# Patient Record
Sex: MALE | Race: WHITE
[De-identification: names, ages, dates, MRNs, and addresses within clinical notes are randomized per-mention and may not be internally consistent; named-entity substitution may affect disease eponyms.]

---

## 2017-04-17 ENCOUNTER — RECORD ABSTRACTING (OUTPATIENT)
Age: 82
End: 2017-04-17

## 2017-04-17 DIAGNOSIS — Z86.73 PERSONAL HISTORY OF TRANSIENT ISCHEMIC ATTACK (TIA), AND CEREBRAL INFARCTION W/OUT RESIDUAL DEFICITS: ICD-10-CM

## 2017-04-17 PROBLEM — Z00.00 ENCOUNTER FOR PREVENTIVE HEALTH EXAMINATION: Status: ACTIVE | Noted: 2017-04-17

## 2017-04-17 RX ORDER — RAMIPRIL 5 MG/1
5 CAPSULE ORAL
Refills: 0 | Status: ACTIVE | COMMUNITY

## 2017-04-17 RX ORDER — ASPIRIN 325 MG/1
TABLET, FILM COATED ORAL
Refills: 0 | Status: ACTIVE | COMMUNITY

## 2017-04-17 RX ORDER — ATORVASTATIN CALCIUM 40 MG/1
40 TABLET, FILM COATED ORAL
Refills: 0 | Status: ACTIVE | COMMUNITY

## 2017-05-24 ENCOUNTER — APPOINTMENT (OUTPATIENT)
Dept: UROLOGY | Facility: CLINIC | Age: 82
End: 2017-05-24
Payer: MEDICARE

## 2017-05-24 ENCOUNTER — OUTPATIENT (OUTPATIENT)
Dept: OUTPATIENT SERVICES | Facility: HOSPITAL | Age: 82
LOS: 1 days | Discharge: HOME | End: 2017-05-24

## 2017-05-24 VITALS — SYSTOLIC BLOOD PRESSURE: 135 MMHG | HEART RATE: 75 BPM | DIASTOLIC BLOOD PRESSURE: 75 MMHG

## 2017-05-24 DIAGNOSIS — Z86.79 PERSONAL HISTORY OF OTHER DISEASES OF THE CIRCULATORY SYSTEM: ICD-10-CM

## 2017-05-24 DIAGNOSIS — Z86.39 PERSONAL HISTORY OF OTHER ENDOCRINE, NUTRITIONAL AND METABOLIC DISEASE: ICD-10-CM

## 2017-05-24 LAB
BILIRUB UR QL STRIP: NORMAL
CLARITY UR: NORMAL
COLLECTION METHOD: NORMAL
GLUCOSE UR-MCNC: NORMAL
HCG UR QL: NORMAL EU/DL
HGB UR QL STRIP.AUTO: NORMAL
KETONES UR-MCNC: NORMAL
LEUKOCYTE ESTERASE UR QL STRIP: 500
NITRITE UR QL STRIP: ABNORMAL
PH UR STRIP: 5
PROT UR STRIP-MCNC: NORMAL
SP GR UR STRIP: 1.02

## 2017-05-24 PROCEDURE — 81003 URINALYSIS AUTO W/O SCOPE: CPT | Mod: QW

## 2017-05-24 PROCEDURE — 51798 US URINE CAPACITY MEASURE: CPT

## 2017-05-24 PROCEDURE — 99214 OFFICE O/P EST MOD 30 MIN: CPT

## 2017-06-28 DIAGNOSIS — N39.0 URINARY TRACT INFECTION, SITE NOT SPECIFIED: ICD-10-CM

## 2017-10-26 ENCOUNTER — APPOINTMENT (OUTPATIENT)
Dept: UROLOGY | Facility: CLINIC | Age: 82
End: 2017-10-26

## 2018-06-16 ENCOUNTER — OUTPATIENT (OUTPATIENT)
Dept: OUTPATIENT SERVICES | Facility: HOSPITAL | Age: 83
LOS: 1 days | Discharge: HOME | End: 2018-06-16

## 2018-06-16 DIAGNOSIS — I10 ESSENTIAL (PRIMARY) HYPERTENSION: ICD-10-CM

## 2018-06-16 DIAGNOSIS — E55.9 VITAMIN D DEFICIENCY, UNSPECIFIED: ICD-10-CM

## 2018-06-16 DIAGNOSIS — E11.9 TYPE 2 DIABETES MELLITUS WITHOUT COMPLICATIONS: ICD-10-CM

## 2021-02-18 ENCOUNTER — INPATIENT (INPATIENT)
Facility: HOSPITAL | Age: 86
LOS: 1 days | Discharge: HOME | End: 2021-02-20
Attending: STUDENT IN AN ORGANIZED HEALTH CARE EDUCATION/TRAINING PROGRAM | Admitting: STUDENT IN AN ORGANIZED HEALTH CARE EDUCATION/TRAINING PROGRAM
Payer: MEDICARE

## 2021-02-18 VITALS
DIASTOLIC BLOOD PRESSURE: 60 MMHG | HEART RATE: 84 BPM | RESPIRATION RATE: 18 BRPM | TEMPERATURE: 98 F | OXYGEN SATURATION: 99 % | SYSTOLIC BLOOD PRESSURE: 98 MMHG | WEIGHT: 169.98 LBS

## 2021-02-18 DIAGNOSIS — Z98.890 OTHER SPECIFIED POSTPROCEDURAL STATES: Chronic | ICD-10-CM

## 2021-02-18 LAB
ALBUMIN SERPL ELPH-MCNC: 3.6 G/DL — SIGNIFICANT CHANGE UP (ref 3.5–5.2)
ALP SERPL-CCNC: 52 U/L — SIGNIFICANT CHANGE UP (ref 30–115)
ALT FLD-CCNC: 36 U/L — SIGNIFICANT CHANGE UP (ref 0–41)
ANION GAP SERPL CALC-SCNC: 17 MMOL/L — HIGH (ref 7–14)
ANISOCYTOSIS BLD QL: SLIGHT — SIGNIFICANT CHANGE UP
APPEARANCE UR: ABNORMAL
APTT BLD: 29.1 SEC — SIGNIFICANT CHANGE UP (ref 27–39.2)
AST SERPL-CCNC: 51 U/L — HIGH (ref 0–41)
B-OH-BUTYR SERPL-SCNC: <0.2 MMOL/L — SIGNIFICANT CHANGE UP
BACTERIA # UR AUTO: NEGATIVE — SIGNIFICANT CHANGE UP
BASE EXCESS BLDV CALC-SCNC: 2.4 MMOL/L — HIGH (ref -2–2)
BASOPHILS # BLD AUTO: 0 K/UL — SIGNIFICANT CHANGE UP (ref 0–0.2)
BASOPHILS NFR BLD AUTO: 0 % — SIGNIFICANT CHANGE UP (ref 0–1)
BILIRUB SERPL-MCNC: 0.9 MG/DL — SIGNIFICANT CHANGE UP (ref 0.2–1.2)
BILIRUB UR-MCNC: NEGATIVE — SIGNIFICANT CHANGE UP
BUN SERPL-MCNC: 40 MG/DL — HIGH (ref 10–20)
CA-I SERPL-SCNC: 1.12 MMOL/L — SIGNIFICANT CHANGE UP (ref 1.12–1.3)
CALCIUM SERPL-MCNC: 8.8 MG/DL — SIGNIFICANT CHANGE UP (ref 8.5–10.1)
CHLORIDE SERPL-SCNC: 85 MMOL/L — LOW (ref 98–110)
CO2 SERPL-SCNC: 23 MMOL/L — SIGNIFICANT CHANGE UP (ref 17–32)
COLOR SPEC: ABNORMAL
CREAT SERPL-MCNC: 2.2 MG/DL — HIGH (ref 0.7–1.5)
DIFF PNL FLD: ABNORMAL
EOSINOPHIL # BLD AUTO: 0.08 K/UL — SIGNIFICANT CHANGE UP (ref 0–0.7)
EOSINOPHIL NFR BLD AUTO: 0.9 % — SIGNIFICANT CHANGE UP (ref 0–8)
EPI CELLS # UR: 1 /HPF — SIGNIFICANT CHANGE UP (ref 0–5)
GAS PNL BLDV: 125 MMOL/L — LOW (ref 136–145)
GAS PNL BLDV: SIGNIFICANT CHANGE UP
GIANT PLATELETS BLD QL SMEAR: PRESENT — SIGNIFICANT CHANGE UP
GLUCOSE SERPL-MCNC: 168 MG/DL — HIGH (ref 70–99)
GLUCOSE UR QL: NEGATIVE — SIGNIFICANT CHANGE UP
HCO3 BLDV-SCNC: 26 MMOL/L — SIGNIFICANT CHANGE UP (ref 22–29)
HCT VFR BLD CALC: 32.9 % — LOW (ref 42–52)
HCT VFR BLDA CALC: 33 % — LOW (ref 34–44)
HGB BLD CALC-MCNC: 10.8 G/DL — LOW (ref 14–18)
HGB BLD-MCNC: 11.2 G/DL — LOW (ref 14–18)
HYALINE CASTS # UR AUTO: 12 /LPF — HIGH (ref 0–7)
HYPOCHROMIA BLD QL: SLIGHT — SIGNIFICANT CHANGE UP
INR BLD: 1.43 RATIO — HIGH (ref 0.65–1.3)
KETONES UR-MCNC: NEGATIVE — SIGNIFICANT CHANGE UP
LACTATE BLDV-MCNC: 3.6 MMOL/L — HIGH (ref 0.5–1.6)
LACTATE SERPL-SCNC: 2.4 MMOL/L — HIGH (ref 0.7–2)
LEUKOCYTE ESTERASE UR-ACNC: ABNORMAL
LIDOCAIN IGE QN: 29 U/L — SIGNIFICANT CHANGE UP (ref 7–60)
LYMPHOCYTES # BLD AUTO: 0.79 K/UL — LOW (ref 1.2–3.4)
LYMPHOCYTES # BLD AUTO: 8.6 % — LOW (ref 20.5–51.1)
MAGNESIUM SERPL-MCNC: 2.3 MG/DL — SIGNIFICANT CHANGE UP (ref 1.8–2.4)
MANUAL SMEAR VERIFICATION: SIGNIFICANT CHANGE UP
MCHC RBC-ENTMCNC: 20 PG — LOW (ref 27–31)
MCHC RBC-ENTMCNC: 34 G/DL — SIGNIFICANT CHANGE UP (ref 32–37)
MCV RBC AUTO: 58.9 FL — LOW (ref 80–94)
MICROCYTES BLD QL: SLIGHT — SIGNIFICANT CHANGE UP
MONOCYTES # BLD AUTO: 0.24 K/UL — SIGNIFICANT CHANGE UP (ref 0.1–0.6)
MONOCYTES NFR BLD AUTO: 2.6 % — SIGNIFICANT CHANGE UP (ref 1.7–9.3)
NEUTROPHILS # BLD AUTO: 7.77 K/UL — HIGH (ref 1.4–6.5)
NEUTROPHILS NFR BLD AUTO: 84.5 % — HIGH (ref 42.2–75.2)
NITRITE UR-MCNC: NEGATIVE — SIGNIFICANT CHANGE UP
PCO2 BLDV: 34 MMHG — LOW (ref 41–51)
PH BLDV: 7.49 — HIGH (ref 7.26–7.43)
PH UR: 5.5 — SIGNIFICANT CHANGE UP (ref 5–8)
PLAT MORPH BLD: NORMAL — SIGNIFICANT CHANGE UP
PLATELET # BLD AUTO: 131 K/UL — SIGNIFICANT CHANGE UP (ref 130–400)
PO2 BLDV: 45 MMHG — HIGH (ref 20–40)
POLYCHROMASIA BLD QL SMEAR: SLIGHT — SIGNIFICANT CHANGE UP
POTASSIUM BLDV-SCNC: 3.4 MMOL/L — SIGNIFICANT CHANGE UP (ref 3.3–5.6)
POTASSIUM SERPL-MCNC: 3.6 MMOL/L — SIGNIFICANT CHANGE UP (ref 3.5–5)
POTASSIUM SERPL-SCNC: 3.6 MMOL/L — SIGNIFICANT CHANGE UP (ref 3.5–5)
PROT SERPL-MCNC: 6 G/DL — SIGNIFICANT CHANGE UP (ref 6–8)
PROT UR-MCNC: ABNORMAL
PROTHROM AB SERPL-ACNC: 16.4 SEC — HIGH (ref 9.95–12.87)
RBC # BLD: 5.59 M/UL — SIGNIFICANT CHANGE UP (ref 4.7–6.1)
RBC # FLD: 15.9 % — HIGH (ref 11.5–14.5)
RBC BLD AUTO: ABNORMAL
RBC CASTS # UR COMP ASSIST: 113 /HPF — HIGH (ref 0–4)
SAO2 % BLDV: 82 % — SIGNIFICANT CHANGE UP
SARS-COV-2 RNA SPEC QL NAA+PROBE: SIGNIFICANT CHANGE UP
SODIUM SERPL-SCNC: 125 MMOL/L — LOW (ref 135–146)
SP GR SPEC: 1.02 — SIGNIFICANT CHANGE UP (ref 1.01–1.03)
TROPONIN T SERPL-MCNC: 0.02 NG/ML — HIGH
UROBILINOGEN FLD QL: SIGNIFICANT CHANGE UP
VARIANT LYMPHS # BLD: 3.4 % — SIGNIFICANT CHANGE UP (ref 0–5)
WBC # BLD: 9.19 K/UL — SIGNIFICANT CHANGE UP (ref 4.8–10.8)
WBC # FLD AUTO: 9.19 K/UL — SIGNIFICANT CHANGE UP (ref 4.8–10.8)
WBC UR QL: 8 /HPF — HIGH (ref 0–5)

## 2021-02-18 PROCEDURE — 71045 X-RAY EXAM CHEST 1 VIEW: CPT | Mod: 26

## 2021-02-18 PROCEDURE — 99285 EMERGENCY DEPT VISIT HI MDM: CPT | Mod: GC

## 2021-02-18 PROCEDURE — 51702 INSERT TEMP BLADDER CATH: CPT

## 2021-02-18 PROCEDURE — 74177 CT ABD & PELVIS W/CONTRAST: CPT | Mod: 26

## 2021-02-18 PROCEDURE — 99223 1ST HOSP IP/OBS HIGH 75: CPT

## 2021-02-18 PROCEDURE — 93010 ELECTROCARDIOGRAM REPORT: CPT

## 2021-02-18 PROCEDURE — 99232 SBSQ HOSP IP/OBS MODERATE 35: CPT | Mod: 25

## 2021-02-18 RX ORDER — SODIUM CHLORIDE 9 MG/ML
1000 INJECTION, SOLUTION INTRAVENOUS ONCE
Refills: 0 | Status: COMPLETED | OUTPATIENT
Start: 2021-02-18 | End: 2021-02-18

## 2021-02-18 RX ORDER — ASPIRIN/CALCIUM CARB/MAGNESIUM 324 MG
81 TABLET ORAL DAILY
Refills: 0 | Status: DISCONTINUED | OUTPATIENT
Start: 2021-02-18 | End: 2021-02-20

## 2021-02-18 RX ORDER — HEPARIN SODIUM 5000 [USP'U]/ML
5000 INJECTION INTRAVENOUS; SUBCUTANEOUS EVERY 12 HOURS
Refills: 0 | Status: DISCONTINUED | OUTPATIENT
Start: 2021-02-18 | End: 2021-02-20

## 2021-02-18 RX ORDER — LISINOPRIL 2.5 MG/1
40 TABLET ORAL DAILY
Refills: 0 | Status: DISCONTINUED | OUTPATIENT
Start: 2021-02-18 | End: 2021-02-18

## 2021-02-18 RX ORDER — CEFTRIAXONE 500 MG/1
1000 INJECTION, POWDER, FOR SOLUTION INTRAMUSCULAR; INTRAVENOUS ONCE
Refills: 0 | Status: COMPLETED | OUTPATIENT
Start: 2021-02-18 | End: 2021-02-18

## 2021-02-18 RX ORDER — ATORVASTATIN CALCIUM 80 MG/1
10 TABLET, FILM COATED ORAL DAILY
Refills: 0 | Status: DISCONTINUED | OUTPATIENT
Start: 2021-02-18 | End: 2021-02-20

## 2021-02-18 RX ORDER — ZINC SULFATE TAB 220 MG (50 MG ZINC EQUIVALENT) 220 (50 ZN) MG
140 TAB ORAL DAILY
Refills: 0 | Status: DISCONTINUED | OUTPATIENT
Start: 2021-02-18 | End: 2021-02-18

## 2021-02-18 RX ORDER — CHLORHEXIDINE GLUCONATE 213 G/1000ML
1 SOLUTION TOPICAL
Refills: 0 | Status: DISCONTINUED | OUTPATIENT
Start: 2021-02-18 | End: 2021-02-20

## 2021-02-18 RX ORDER — TAMSULOSIN HYDROCHLORIDE 0.4 MG/1
0.4 CAPSULE ORAL AT BEDTIME
Refills: 0 | Status: DISCONTINUED | OUTPATIENT
Start: 2021-02-18 | End: 2021-02-20

## 2021-02-18 RX ORDER — HYDROCHLOROTHIAZIDE 25 MG
12.5 TABLET ORAL DAILY
Refills: 0 | Status: DISCONTINUED | OUTPATIENT
Start: 2021-02-18 | End: 2021-02-18

## 2021-02-18 RX ORDER — SODIUM CHLORIDE 9 MG/ML
1000 INJECTION INTRAMUSCULAR; INTRAVENOUS; SUBCUTANEOUS
Refills: 0 | Status: DISCONTINUED | OUTPATIENT
Start: 2021-02-18 | End: 2021-02-20

## 2021-02-18 RX ORDER — NIFEDIPINE 30 MG
60 TABLET, EXTENDED RELEASE 24 HR ORAL DAILY
Refills: 0 | Status: DISCONTINUED | OUTPATIENT
Start: 2021-02-18 | End: 2021-02-18

## 2021-02-18 RX ORDER — SERTRALINE 25 MG/1
50 TABLET, FILM COATED ORAL DAILY
Refills: 0 | Status: DISCONTINUED | OUTPATIENT
Start: 2021-02-18 | End: 2021-02-20

## 2021-02-18 RX ORDER — ZINC SULFATE TAB 220 MG (50 MG ZINC EQUIVALENT) 220 (50 ZN) MG
220 TAB ORAL DAILY
Refills: 0 | Status: DISCONTINUED | OUTPATIENT
Start: 2021-02-18 | End: 2021-02-20

## 2021-02-18 RX ORDER — LIDOCAINE HCL 20 MG/ML
5 VIAL (ML) INJECTION ONCE
Refills: 0 | Status: COMPLETED | OUTPATIENT
Start: 2021-02-18 | End: 2021-02-18

## 2021-02-18 RX ADMIN — Medication 5 MILLILITER(S): at 17:07

## 2021-02-18 RX ADMIN — CEFTRIAXONE 100 MILLIGRAM(S): 500 INJECTION, POWDER, FOR SOLUTION INTRAMUSCULAR; INTRAVENOUS at 18:55

## 2021-02-18 RX ADMIN — SODIUM CHLORIDE 1000 MILLILITER(S): 9 INJECTION, SOLUTION INTRAVENOUS at 20:27

## 2021-02-18 RX ADMIN — SODIUM CHLORIDE 1000 MILLILITER(S): 9 INJECTION, SOLUTION INTRAVENOUS at 12:39

## 2021-02-18 RX ADMIN — SODIUM CHLORIDE 1000 MILLILITER(S): 9 INJECTION, SOLUTION INTRAVENOUS at 13:59

## 2021-02-18 RX ADMIN — TAMSULOSIN HYDROCHLORIDE 0.4 MILLIGRAM(S): 0.4 CAPSULE ORAL at 20:24

## 2021-02-18 NOTE — PROGRESS NOTE ADULT - SUBJECTIVE AND OBJECTIVE BOX
CC: weakness    HPI:  86 yo M pt w/ a relevant hx of a CVA in 2015, mild neurocognitive impairment (vs. early dementia) & BPH. P/w weakness. Onset of symptoms: ongoing x 1 month. Course: persistent w/ worsening. Quality: b/l LE & generalized weakness - unable to get up. Intensity: moderate to severe. Precipitating factors: ?UTI (pt was treated for a UTI w/ a 7 day course of ciprofloxacin and then w/ TMP-SMX - day 11). Associated symptoms: nausea, vomiting (since resolved), poor PO intake w/ anorexia, an inability to ambulate due to weakness & hypotension (held his BP meds at home due to low BP). Alleviating factors: none. Aggravating factors: none. Pt coming in due to the persistence and progressive worsening of his symptoms. ROS negative for chest pain, palpitations, focal deficits, fever, coughing and SOB.     ROS:  Constitutional: no fever; +generalized weakness  ENT: no dysphagia; no sore throat  CVS: no chest pain; no palpitations; +low BP (held BP meds)  Resp: no SOB; no coughing  GI: no vomiting (resolved before current presentation); no diarrhea; no abd pain; +anorexia  : +dysuria (reports burning at the time of this eval - since downey was placed)  MSK: no myalgias; no arthralgias  Skin: no rashes  Neuro: no focal weakness; no headache  All other systems reviewed and are negative    Home Medications:  aspirin 81 mg oral tablet, chewable: 1 tab(s) orally once a day (18 Feb 2021 11:29)  atorvastatin 10 mg oral tablet: 1 tab(s) orally once a day (18 Feb 2021 11:29)  Calcium 600+D Plus Minerals oral tablet, chewable: 1 tab(s) orally once a day (18 Feb 2021 11:29)  folic acid 1 mg oral tablet: 1 tab(s) orally once a day (18 Feb 2021 11:29)  hydroCHLOROthiazide 12.5 mg oral capsule: 1 cap(s) orally once a day (18 Feb 2021 11:29)  metFORMIN 500 mg oral tablet, extended release: 1 tab(s) orally once a day (18 Feb 2021 11:29)  NIFEdipine 60 mg oral tablet, extended release: 1 tab(s) orally once a day (18 Feb 2021 11:29)  ramipril 10 mg oral tablet: 10 milligram(s) orally 2 times a day (18 Feb 2021 11:29)  sertraline 50 mg oral tablet: 1 tab(s) orally once a day (18 Feb 2021 11:29)  tamsulosin 0.4 mg oral capsule: 1 cap(s) orally once a day (18 Feb 2021 11:29)  Vitamin C 25 mg oral tablet, chewable: 1 tab(s) orally once a day (18 Feb 2021 11:29)  Vitamin D3 50,000 intl units (1250 mcg) oral capsule:  (18 Feb 2021 11:29)  Zinc 140 mg (as elemental zinc 50 mg) oral tablet: 1 tab(s) orally once a day (18 Feb 2021 11:29)    PSFH:  HLD, HTN, depression, DM, melanoma, BPH, CVA  Excision of skin lesion  Denies FHx  Denies hx of tobacco use; denies alcohol & illicit drug use    Exam:  Vitals: BP = 108/58; P = 84; T = 98.1; RR = 18; SpO2 94 on room air  General: appears stated age; cooperative  Eyes: anicteric sclera; moist conjunctiva; PERRL; EOMI  HENT: NC/AT; clear oropharynx w/ moist mucous membranes; nL hard/soft palate  Neck: supple w/ FROM; trachea midline  Lungs: no tachypnea, accessory muscle use, wheezing, rhonci;  CVS: RRR; S1 and S2 w/ 2+ systolic murmur at lateral sternal border and apex; no rubs; no gallops  Abd: BS+; soft; non-tender to palpation x 4; no masses or HSM  Ext: no peripheral edema; pulses palpable distally  Skin: warm; dry; intact; no pallor; no jaundice  Neuro: CN II-XII intact; able to move all extremities  Psych: appropriate affect; alert and oriented to person and place; partially to situation    Labs reviewed and are significant for:  H&H 11.2/32.9  MCV 58.9  INR 1.43  Lactate 2.4  Na 125  AG 17  Bicarb 23  BUN/Cr 40/2.2    AST 51  Trop 0.02  pH 7.49  pCO2 34  U/A w/ 8 WBC's, small LE, 113 RBC's, 12 hyaline casts, 30 mg/dL protein, slightly turbid    Imaging reviewed and shows:  --- No acute abdominal/pelvic process  --- Enlarged prostate gland; no hydronephrosis  --- No acute cardiopulmonary process on CXR    EKG reviewed:  Sinus rhythm; artifact; PAC's

## 2021-02-18 NOTE — CONSULT NOTE ADULT - SUBJECTIVE AND OBJECTIVE BOX
UROLOGY:  88 yo M with PMHx of BPH, CVA  with residual mild dementia, depression, DM, HLD, HTN, melanoma s/p excision who presents to the ED with generalized weakness in BLE x2 days. Urology was consulted by ED resident for difficulty with downey placement 2/2 to BPH. Patient is pertinent for vomiting. Patient follows with Dr. Prajapati for BPH, last seen in 2017.    [ x ] A 10 Point Review of Systems was negative except where noted    PAST MEDICAL & SURGICAL HISTORY:  Heart murmur  High cholesterol  Depression  Melanoma  Diabetes  HTN (hypertension)  BPH (benign prostatic hyperplasia)  CVA (cerebral vascular accident)  History of melanoma excision    MEDICATIONS  (STANDING):  cefTRIAXone   IVPB 1000 milliGRAM(s) IV Intermittent once  lactated ringers Bolus 1000 milliLiter(s) IV Bolus once    MEDICATIONS  (PRN):  Allergies  No Known Allergies  Intolerances    SOCIAL HISTORY: No illicit drug use    Vital Signs Last 24 Hrs  T(C): 36.4 (2021 16:17), Max: 37.3 (2021 12:00)  T(F): 97.5 (2021 16:17), Max: 99.1 (2021 12:00)  HR: 69 (2021 17:30) (69 - 93)  BP: 95/53 (2021 17:30) (85/49 - 99/56)  BP(mean): 72 (2021 17:30) (72 - 72)  RR: 18 (2021 16:17) (18 - 18)  SpO2: 96% (2021 16:17) (96% - 99%)    Physical Exam:   Constitutional: NAD, well-developed  HEENT: EOMI  Neck: no pain  Back: No CVA tenderness  Respiratory: No accessory respiratory muscle use  Abd: Soft, NT/ND  no organomegally  no hernia  :    Extremities: no edema  Neurological: A/O x 3  Psychiatric: Normal mood, normal affect  Skin: No rashes    Labs                        11.2   9.19  )-----------( 131      ( 2021 12:29 )             32.9     2021 12:29    125    |  85     |  40     ----------------------------<  168    3.6     |  23     |  2.2      Ca    8.8        2021 12:29  Mg     2.3       2021 12:29      PT/INR - ( 2021 12:29 )   PT: 16.40 sec;   INR: 1.43 ratio         PTT - ( 2021 12:29 )  PTT:29.1 sec  Urinalysis Basic - ( 2021 11:36 )    Color: Light Orange / Appearance: Slightly Turbid / S.024 / pH: x  Gluc: x / Ketone: Negative  / Bili: Negative / Urobili: <2 mg/dL   Blood: x / Protein: 30 mg/dL / Nitrite: Negative   Leuk Esterase: Small / RBC: x / WBC x   Sq Epi: x / Non Sq Epi: x / Bacteria: x      I&O's Detail      RADIOLOGY/IMAGING:  UROLOGY:  88 yo M with PMHx of BPH, CVA  with residual mild dementia, depression, DM, HLD, HTN, melanoma s/p excision who presents to the ED with generalized weakness in BLE x2 days. Urology was consulted by ED resident for difficulty with downey placement 2/2 to BPH. Patient is pertinent for vomiting. Patient follows with Dr. Prajapati for BPH, last seen in 2017.    [ x ] A 10 Point Review of Systems was negative except where noted    PAST MEDICAL & SURGICAL HISTORY:  Heart murmur  High cholesterol  Depression  Melanoma  Diabetes  HTN (hypertension)  BPH (benign prostatic hyperplasia)  CVA (cerebral vascular accident)  History of melanoma excision    MEDICATIONS  (STANDING):  cefTRIAXone   IVPB 1000 milliGRAM(s) IV Intermittent once  lactated ringers Bolus 1000 milliLiter(s) IV Bolus once    MEDICATIONS  (PRN):  Allergies  No Known Allergies  Intolerances    SOCIAL HISTORY: No illicit drug use    Vital Signs Last 24 Hrs  T(C): 36.4 (2021 16:17), Max: 37.3 (2021 12:00)  T(F): 97.5 (2021 16:17), Max: 99.1 (2021 12:00)  HR: 69 (2021 17:30) (69 - 93)  BP: 95/53 (2021 17:30) (85/49 - 99/56)  BP(mean): 72 (2021 17:30) (72 - 72)  RR: 18 (2021 16:17) (18 - 18)  SpO2: 96% (2021 16:17) (96% - 99%)    Physical Exam:   Constitutional: NAD, well-developed, laying in bed  HEENT: EOMI  Neck: no pain  Respiratory: No accessory respiratory muscle use  Abd: Soft, obese, with mild suprapubic tenderness upon palpation, no organomegaly, no hernia palpable  : slight penile erythema and tenderness upon palpation, patient uncircumcised, no lesions or swelling of the penile shaft noted, noticeable blood noticed on penile meatus 2/2 to trauma from repeated catheterization from ED team, left testicle slightly elevated compared to left testicle, no scrotal edema noticed, 16 F coude downey catheter placed with drainage of 400 mL, clear yellow urine, no visible clots noted    Labs             11.2   9.19  )-----------( 131      ( 2021 12:29 )             32.9     2021 12:29    125    |  85     |  40     ----------------------------<  168    3.6     |  23     |  2.2      Ca    8.8        2021 12:29  Mg     2.3       2021 12:29      PT/INR - ( 2021 12:29 )   PT: 16.40 sec;   INR: 1.43 ratio      PTT - ( 2021 12:29 )  PTT:29.1 sec  Urinalysis Basic - ( 2021 11:36 )    Color: Light Orange / Appearance: Slightly Turbid / S.024 / pH: x  Gluc: x / Ketone: Negative  / Bili: Negative / Urobili: <2 mg/dL   Blood: x / Protein: 30 mg/dL / Nitrite: Negative   Leuk Esterase: Small / RBC: x / WBC x   Sq Epi: x / Non Sq Epi: x / Bacteria: x    I&O's Detail          RADIOLOGY/IMAGING:   AM:  CT ABDOMEN AND PELVIS IC          PROCEDURE DATE:  2021    INTERPRETATION:  CLINICAL STATEMENT: Vomiting.    TECHNIQUE: Contiguous axial CT images were obtained from the lower chest to the pubic symphysis following administration of 100cc Optiray 320 intravenous contrast.  Oral contrast was not administered.  Reformatted images in the coronal and sagittal planes were acquired.    COMPARISON CT: None.    FINDINGS:  LOWER CHEST: Mild bibasilar subsegmental atelectasis.  HEPATOBILIARY: Coarse left hepatic calcification.  SPLEEN: Unremarkable.  PANCREAS: Unremarkable.  ADRENAL GLANDS: Unremarkable.  KIDNEYS: Symmetric renal enhancement. A 1.7 cm right renal interpolar cyst is noted. Additional subcentimeter right renal lower pole hypodensity is too small to further characterize. No hydronephrosis.  ABDOMINOPELVIC NODES: Unremarkable.  PELVIC ORGANS: Enlarged prostate gland. Symmetric seminal vesicles.  PERITONEUM/MESENTERY/BOWEL: Small to moderate hiatal hernia. No evidence of bowel obstruction. No ascites or free intraperitoneal air. Normal caliber appendix.  BONES/SOFT TISSUES: Degenerative changes of the spine. No acute osseous abnormality.  OTHER: Atherosclerotic vascular calcification.    IMPRESSION:    1. No evidence of acute/inflammatory process within the abdomen or pelvis.  2. Small to moderate hiatal hernia.  3. Enlarged prostate gland.              CRISTINA GARRETT MD; Attending Radiologist  This document has been electronically signed. 2021  6:09PM

## 2021-02-18 NOTE — PROGRESS NOTE ADULT - ASSESSMENT
88 yo M pt w/ multiple medical ailments being treated on an out-pt basis for a UTI now coming with a failure to thrive picture (poor PO intake, mild neuro-cognitive impairment, functional decline w/ an inability to ambulate due to generalized weakness). Hemodynamically stable and afebrile. Needs PT.   88 yo M pt w/ multiple medical ailments being treated on an out-pt basis for a UTI now coming with a failure to thrive picture (poor PO intake, mild neuro-cognitive impairment, functional decline w/ an inability to ambulate due to generalized weakness). Hemodynamically stable and afebrile. Needs PT.    (1) Failure to thrive (poor PO intake, mild neuro-cognitive impairment, functional decline):  --- Supportive care: optimize nutritional status - encourage PO intake  --- PT eval    MEDICATIONS  (STANDING):  aspirin  chewable 81 milliGRAM(s) Oral daily  atorvastatin Oral Tab/Cap - Peds 10 milliGRAM(s) Oral daily  chlorhexidine 4% Liquid 1 Application(s) Topical <User Schedule>  heparin   Injectable 5000 Unit(s) SubCutaneous every 12 hours  sertraline 50 milliGRAM(s) Oral daily  sodium chloride 0.9%. 1000 milliLiter(s) (75 mL/Hr) IV Continuous <Continuous>  tamsulosin 0.4 milliGRAM(s) Oral at bedtime  zinc sulfate 220 milliGRAM(s) Oral daily    MEDICATIONS  (PRN):      (2) Stage 2 acute renal failure (serum Cr 1 in 2018) - suspect pre-renal etiology 2/2 poor PO:  --- S/p fluid bolus in the ED  --- S/p downey placement in the ED  --- Monitor urine output (strict I&O)  --- Monitor BUN/Cr  --- Avoid nephrotoxins  --- Dose medications renally    (3) Urinary retention 2/2 BPH (s/p downey catheterization in the ED w/ 400 mL subsequent output):  --- C/w downey catheter for now  --- C/w tamsulosin  --- Can consider voiding trial prior to d/c vs. out-pt Urology f/u for TOV    (4) S/p out-pt treatment for UTI w/ 7 day course of cipro & 11 days of TMP-SMX:  --- Monitor off abx (pt has been adequately treated for a UTI)    (5) DM w/ hyperglycemia:  --- POC BG qAC & qHS  --- Use gentle correction scale for now (poor PO intake - risk for hypoglycemia)  --- Hypoglycemia protocol PRN  --- If BG levels persistently elevated, start basal bolus insulin and titrate up as necessary  --- F/u A1C    (6) HTN - chronic (now relatively hypotensive) / dyslipidemia - chronic:  --- C/w statin as dosed  --- Holding anti-hypertensives    (7) Microcytic anemia possible thalassemia trait (Mentzer index < 13):  --- Stable H&H (monitor periodically - no c/o bleeding)    (8) Hyponatremia w/ dehydration:  --- S/p fluid boluses in the ED  --- F/u repeat BMP    (9) Elevated anion gap acidosis w/ concurrent met alkalosis 2/2 renal insufficiency:  --- Clinically stable  --- Monitor renal function as above    (10) Mildly elevated lactic acid level likely 2/2 dehydration and poor PO intake:  --- S/p fluid resuscitation; encourage PO intake    (11) Hx of CVA in 2015 w/ residual deficits - chronic:  --- ASA, statin  --- Supportive care  --- PT    (12) Depression - stable mood:  --- C/w sertraline    (13) Miscellaneous:  --- Monitor and replete lytes PRN  --- Carb consistent / DASH-TLC diet  --- DVT ppx w/ heparin  --- GI ppx is not indicated    (14) Disposition:  --- Was placed on Telemetry as there was concern for ?A-fib - EKG unrevealing (can repeat EKG)  --- If remains stable, clinically, can downgrade from Telemetry  --- Pending PT evaluation and treatment  --- Pending clinical improvement  --- Pending correction of hyponatremia  --- Anticipate need for 48 hrs of in-pt care    Full code 86 yo M pt w/ multiple medical ailments being treated on an out-pt basis for a UTI now coming with a failure to thrive picture (poor PO intake, mild neuro-cognitive impairment, functional decline w/ an inability to ambulate due to generalized weakness). Hemodynamically stable and afebrile. Needs PT.    (1) Failure to thrive (poor PO intake, mild neuro-cognitive impairment, functional decline):  --- Supportive care: optimize nutritional status - encourage PO intake  --- PT eval    (2) Stage 2 acute renal failure (serum Cr 1 in 2018) - suspect pre-renal etiology 2/2 poor PO:  --- S/p fluid bolus in the ED  --- S/p downey placement in the ED  --- Monitor urine output (strict I&O)  --- Monitor BUN/Cr  --- Avoid nephrotoxins  --- Dose medications renally    (3) Urinary retention 2/2 BPH (s/p downey catheterization in the ED w/ 400 mL subsequent output):  --- C/w downey catheter for now  --- C/w tamsulosin  --- Can consider voiding trial prior to d/c vs. out-pt Urology f/u for TOV    (4) S/p out-pt treatment for UTI w/ 7 day course of cipro & 11 days of TMP-SMX:  --- Monitor off abx (pt has been adequately treated for a UTI)    (5) DM w/ hyperglycemia:  --- POC BG qAC & qHS  --- Use gentle correction scale for now (poor PO intake - risk for hypoglycemia)  --- Hypoglycemia protocol PRN  --- If BG levels persistently elevated, start basal bolus insulin and titrate up as necessary  --- F/u A1C    (6) HTN - chronic (now relatively hypotensive) / dyslipidemia - chronic:  --- C/w statin as dosed  --- Holding anti-hypertensives    (7) Microcytic anemia possible thalassemia trait (Mentzer index < 13):  --- Stable H&H (monitor periodically - no c/o bleeding)    (8) Hyponatremia w/ dehydration:  --- S/p fluid boluses in the ED  --- F/u repeat BMP    (9) Elevated anion gap acidosis w/ concurrent met alkalosis 2/2 renal insufficiency:  --- Clinically stable  --- Monitor renal function as above    (10) Mildly elevated lactic acid level likely 2/2 dehydration and poor PO intake:  --- S/p fluid resuscitation; encourage PO intake    (11) Hx of CVA in 2015 w/ residual deficits - chronic:  --- ASA, statin  --- Supportive care  --- PT    (12) Depression - stable mood:  --- C/w sertraline    (13) Miscellaneous:  --- Monitor and replete lytes PRN  --- Carb consistent / DASH-TLC diet  --- DVT ppx w/ heparin  --- GI ppx is not indicated    (14) Disposition:  --- Was placed on Telemetry as there was concern for ?A-fib - EKG unrevealing (can repeat EKG)  --- If remains stable, clinically, can downgrade from Telemetry  --- Pending PT evaluation and treatment  --- Pending clinical improvement  --- Pending correction of hyponatremia  --- Anticipate need for 48 hrs of in-pt care    Full code

## 2021-02-18 NOTE — H&P ADULT - NSHPPHYSICALEXAM_GEN_ALL_CORE
GENERAL: NAD, lying in bed comfortably  HEAD:  Atraumatic, Normocephalic  EYES: EOMI, PERRLA, conjunctiva and sclera clear  ENT: Moist mucous membranes  NECK: Supple, No JVD  CHEST/LUNG: Clear to auscultation bilaterally;   HEART: Regular rate and rhythm; No murmurs, rubs, or gallops  ABDOMEN: Bowel sounds present; Soft,   EXTREMITIES:  2+ Peripheral Pulses, brisk capillary refill  NERVOUS SYSTEM:  Alert & Oriented X3, speech clear. No deficits   SKIN: No rashes, s/p excision of chest melanoma Vital Signs Last 24 Hrs  T(C): 36.4 (18 Feb 2021 16:17), Max: 37.3 (18 Feb 2021 12:00)  T(F): 97.5 (18 Feb 2021 16:17), Max: 99.1 (18 Feb 2021 12:00)  HR: 74 (18 Feb 2021 18:30) (69 - 93)  BP: 99/57 (18 Feb 2021 18:30) (85/49 - 99/57)  BP(mean): 72 (18 Feb 2021 17:30) (72 - 72)  RR: 16 (18 Feb 2021 18:30) (16 - 18)  SpO2: 97% (18 Feb 2021 18:30) (96% - 99%)      GENERAL: NAD, lying in bed comfortably  HEAD:  Atraumatic, Normocephalic  EYES: EOMI, PERRLA, conjunctiva and sclera clear  ENT: Moist mucous membranes  NECK: Supple, No JVD  CHEST/LUNG: Clear to auscultation bilaterally;   HEART: Regular rate and rhythm; No murmurs, rubs, or gallops  ABDOMEN: Bowel sounds present; Soft,   EXTREMITIES:  2+ Peripheral Pulses, brisk capillary refill  NERVOUS SYSTEM:  Alert & Oriented X3, speech clear. No deficits   SKIN: No rashes, s/p excision of chest melanoma

## 2021-02-18 NOTE — ED ADULT NURSE NOTE - PMH
BPH (benign prostatic hyperplasia)    CVA (cerebral vascular accident)    Depression    Diabetes    Heart murmur    High cholesterol    HTN (hypertension)    Melanoma

## 2021-02-18 NOTE — ED ADULT NURSE REASSESSMENT NOTE - NS ED NURSE REASSESS COMMENT FT1
assumed care of pt. he is resting on cardiac monitor. respirations even and unlabored. will continue to monitor pt.

## 2021-02-18 NOTE — H&P ADULT - ASSESSMENT
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 History was taken from daughter daughter Melinda [phone] who stated that patient had melanoma excision from anterior chest wall 1/26 (derm Dr. Chaudhry) and developed dysuria. He completed 7-day course of cipro but then developed vomiting so switched to bactrim. Patient developed weakness and had poor oral intake since, he visited [facility] too and got hydration but felt weak progressively.  He denied any fever, cough, cp, sob, abd pain, diarrhea, dysuria, fall, head trauma.     Patient is received vitally stable in ER. Blood work is significant for elevated lactate 2.4 , hyponatremia 125, elevated Cr BUN 2.2/40 (Unknown baseline), AST 51 and elevated troponin 0.02. Urology was consulted for Glasgow placement in ER. Patient is being admitted for ROXANNA of post renal cause.     #ROXANNA secondary to enlarged prostate(BPH)/ elevated Cr BUN 2.2/40, elevated lactate, hyponatremia :  -Baseline creatinine unknown  -UA showed LE (small), few bacteria <10 and blood.  - CT abdomen pelvis is unremarkable except for enlarged prostate gland.   -Urology was consulted for Glasgow placement in ER, difficult due to obstruction.   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 CT abdomen pelvis is unremarkable except for enlarged prostate gland  -doubt the need of abx for now    #Depression:  -sertraline 50mg daily, continued.     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 History was taken from daughter daughter Melinda [phone] who stated that patient had melanoma excision from anterior chest wall 1/26 (derm Dr. Chaudhry) and developed dysuria. He completed 7-day course of cipro but then developed vomiting so switched to bactrim. Patient developed weakness and had poor oral intake since, he visited [facility] too and got hydration but felt weak progressively.  He denied any fever, cough, cp, sob, abd pain, diarrhea, dysuria, fall, head trauma.     Patient is received vitally stable in ER. Blood work is significant for elevated lactate 2.4 , hyponatremia 125, elevated Cr BUN 2.2/40 (Unknown baseline), AST 51 and elevated troponin 0.02. Urology was consulted for Glasgow placement in ER. Patient is being admitted for ROXANNA of post renal cause.     #ROXANNA secondary to enlarged prostate(BPH)/ elevated Cr BUN 2.2/40, elevated lactate, hyponatremia :  -Baseline creatinine unknown  -UA showed LE (small), few bacteria <10 and blood.  - CT abdomen pelvis is unremarkable except for enlarged prostate gland.   -Urology was consulted for Glasgow placement in ER, difficult due to obstruction.   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 CT abdomen pelvis is unremarkable except for enlarged prostate gland  -doubt the need of abx for now    #Depression:  -sertraline 50mg daily, continued.     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 History was taken from daughter daughter Melinda [phone] who stated that patient had melanoma excision from anterior chest wall 1/26 (derm Dr. Chaudhry) and developed dysuria. He completed 7-day course of cipro but then developed vomiting so switched to bactrim. Patient developed weakness and had poor oral intake since, he visited [facility] too and got hydration but felt weak progressively.  He denied any fever, cough, cp, sob, abd pain, diarrhea, dysuria, fall, head trauma.     Patient is received vitally stable in ER. Blood work is significant for elevated lactate 2.4 , hyponatremia 125, elevated Cr BUN 2.2/40 (Unknown baseline), AST 51 and elevated troponin 0.02. Urology was consulted for Glasgow placement in ER. Patient is being admitted for ROXANNA of post renal cause.     #ROXANNA secondary to enlarged prostate(BPH)/ elevated Cr BUN 2.2/40, elevated lactate, hyponatremia :  -Baseline creatinine unknown  -UA showed LE (small), few bacteria <10 and blood.  - CT abdomen pelvis is unremarkable except for enlarged prostate gland.   -Urology was consulted for Glasgow placement in ER, difficult due to obstruction.   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 CT abdomen pelvis is unremarkable except for enlarged prostate gland  -doubt the need of abx for now but due to change in mental status in ER, recent instrumentation and low BP, will continue bactrim for now  -f/u cultures    #Depression:  -sertraline 50mg daily, continued.     #DM:  -Holding metformin for now, monitor FS    DVT PPX  GI PPX NOT NEEDED  FULL CODE  CHG AIT  PT consult placed

## 2021-02-18 NOTE — ED PROVIDER NOTE - PROGRESS NOTE DETAILS
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 Recently tx'ed for UTI with cipro, currently on bactrim. Here in ED, BP 90s/60s. Ordered labs, ekg, cxr, urine. Given IVF. Will reassess. TC: Rectal temp 99.1. Labs notable for BUN/Cr 40/2.2 (previously 26/1 on 6/16/18), Na 125, Cl 85, trop 0.02, lactate 3.6. Ekg with new onset afib. Pending cxr, CT, urine. SR: patient signed out to dr. taylor to follow up imaging and reasses TC: Unable to straight cath or place downey for urine despite urojet. CT abd shows enlarged prostate. Spoke with zain Castillo who will see pt and attempt 14Fr cudet. TC: Unable to straight cath or place downey for urine despite urojet, downey without resistance but no urine return. CT abd shows enlarged prostate. Spoke with zain Castillo who will see pt and attempt 14Fr coude. TC: Uro team able to place 14Fr coude. Blood tinge urine sent to lab. Repeat lactate sent out. CT abd shows enlarged prostate, no other acute pathology. Updated daughter Melinda [phone] over phone. Will admit.

## 2021-02-18 NOTE — ED PROVIDER NOTE - PHYSICAL EXAMINATION
CONSTITUTIONAL: well developed, nontoxic appearing, in no acute distress, speaking in full sentences  SKIN: warm, dry, well healing 6cm excision to anterior chest wall without surrounding erythema/swelling, no fluctuance, no induration, no active discharge, cap refill < 2 seconds  HEENT: normocephalic, atraumatic, no conjunctival erythema, moist mucous membranes, patent airway  NECK: supple  CV:  regular rate, regular rhythm, 2+ radial pulses bilaterally  RESP: no wheezes, no rales, no rhonchi, normal work of breathing  ABD: soft, nontender, nondistended, no rebound, no guarding  MSK: normal ROM, no cyanosis, no edema  NEURO: alert, oriented, grossly unremarkable  PSYCH: cooperative, appropriate CONSTITUTIONAL: well developed, nontoxic appearing, in no acute distress, speaking in full sentences  SKIN: warm, dry, well healing 6cm excision to anterior chest wall without surrounding erythema/swelling, no fluctuance, no induration, no active discharge, cap refill < 2 seconds  HEENT: normocephalic, atraumatic, no conjunctival erythema, moist mucous membranes, patent airway  NECK: supple  CV:  irregularly irregular, 2+ radial pulses bilaterally  RESP: no wheezes, no rales, no rhonchi, normal work of breathing  ABD: soft, nontender, nondistended, no rebound, no guarding  MSK: normal ROM, no cyanosis, no edema  NEURO: alert, oriented, grossly unremarkable  PSYCH: cooperative, appropriate CONSTITUTIONAL: well developed, nontoxic appearing, in no acute distress, speaking in full sentences  SKIN: warm, dry, well healing 6cm excision to anterior chest wall without surrounding erythema/swelling, no fluctuance, no induration, no active discharge, cap refill < 2 seconds  HEENT: normocephalic, atraumatic, no conjunctival erythema, moist mucous membranes, patent airway  NECK: supple  CV:  irregularly irregular, 2+ radial pulses bilaterally  RESP: no wheezes, no rales, no rhonchi, normal work of breathing  ABD: soft, nontender, nondistended, no rebound, no guarding  MSK: normal ROM, no cyanosis, no edema, 5/5 motor strength BLE  NEURO: alert, oriented, grossly unremarkable  PSYCH: cooperative, appropriate

## 2021-02-18 NOTE — ED PROVIDER NOTE - NS ED ROS FT
GEN:  no fever, no chills, + generalized weakness  NEURO:  no headache, no dizziness  ENT: no sore throat, no runny nose  CV:  no chest pain, no palpitations  RESP:  no sob, no cough  GI:  no nausea, + vomiting now resolved, no abdominal pain, no diarrhea  :  no dysuria, no urinary frequency, no hematuria  MSK:  no joint pain, no edema  SKIN:  + melanoma removal, no bruising  HEME: no hematochezia, no melena

## 2021-02-18 NOTE — ED PROVIDER NOTE - ATTENDING CONTRIBUTION TO CARE
I personally evaluated the patient. I reviewed the Resident’s or Physician Assistant’s note (as assigned above), and agree with the findings and plan except as documented in my note. I personally evaluated the patient. I reviewed the Resident’s or Physician Assistant’s note (as assigned above), and agree with the findings and plan except as documented in my note.  87 year old male with a pmh of DM HTN HLD CVA BPH & a recent excision for a melanoma on his anterior chest c/o dysuria which he is now on day 11 of bactrim presents here for generalized weakness. "  Patient states he becomes lightheaded when he walks, endorses vomiting yesterday and today. Patient Also endorses epigastric pain that he describes as "burning". No fever chills cough cp sob.  On exam  CONSTITUTIONAL: WA / WN / NAD  HEAD: NCAT  EYES: PERRL; EOMI;   ENT: Normal pharynx; mucous membranes pink/moist, no erythema.  NECK: Supple; no meningeal signs  CARD: IRR; nl S1/S2; no M/R/G.   RESP: Respiratory rate and effort are normal; breath sounds clear and equal bilaterally.  ABD: Soft, NT ND   MSK/EXT: No gross deformities; full range of motion.  SKIN: Warm and dry;   NEURO: AAOx3  PSYCH: Memory Intact, Normal Affect

## 2021-02-18 NOTE — ED PROVIDER NOTE - OBJECTIVE STATEMENT
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 Per family, pt had melanoma excision from anterior chest wall 1/26 (Dr. Chaudhry) and developed dysuria after. Completed 7-day course of cipro but then developed vomiting so switched to bactrim, currently on day 11. Since then vomiting stopped but has not been eating well so went to [facility] where they told him to drink fluids for dehydration. Yesterday pt had been refusing to get up due to BLE weakness and BP was low so has been holding BP meds. No fever, cough, cp, sob, abd pain, diarrhea, dysuria, fall, head trauma.     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 Per family, pt had melanoma excision from anterior chest wall 1/26 (derm Dr. Chaudhry) and developed dysuria after. Completed 7-day course of cipro but then developed vomiting so switched to bactrim, currently on day 11. Since then vomiting stopped but has not been eating well so went to [place] where they told him to drink fluids for dehydration. Yesterday pt had been refusing to get up due to BLE weakness and BP was low so has been holding BP meds. No fever, cough, cp, sob, abd pain, diarrhea, dysuria, fall, head trauma.     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 Per daughter Melinda [phone], pt had melanoma excision from anterior chest wall 1/26 (derm Dr. Chaudhry) and developed dysuria after. Completed 7-day course of cipro but then developed vomiting so switched to bactrim, currently on day 11. Since then vomiting stopped but has not been eating well so went to [facility] [place] [facility] where they told him to drink fluids for dehydration. Yesterday pt had been refusing to get up due to BLE weakness and BP was low so has been holding BP meds. No fever, cough, cp, sob, abd pain, diarrhea, dysuria, fall, head trauma.     Nephro: Dr. Sosa  Uro: Dr. Prajapati  Cardio: Dr. Alvarenga

## 2021-02-18 NOTE — H&P ADULT - HISTORY OF PRESENT ILLNESS
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 History was taken from daughter daughter Melinda [phone] who stated that patient had melanoma excision from anterior chest wall 1/26 (derm Dr. Chaudhry) and developed dysuria. He completed 7-day course of cipro but then developed vomiting so switched to bactrim. Patient developed weakness and had poor oral intake since, he visited [facility] too and got hydration but felt weak progressively.  He denied any fever, cough, cp, sob, abd pain, diarrhea, dysuria, fall, head trauma.     Vital Signs (24 Hrs):  T(C): 36.4 (02-18-21 @ 16:17), Max: 37.3 (02-18-21 @ 12:00)  HR: 74 (02-18-21 @ 18:30) (69 - 93)  BP: 99/57 (02-18-21 @ 18:30) (85/49 - 99/57)  RR: 16 (02-18-21 @ 18:30) (16 - 18)  SpO2: 97% (02-18-21 @ 18:30) (96% - 99%)    Patient is received vitally stable in ER. Blood work is significant for elevated lactate, hyponatremia 125, elevated Cr BUN 2.2/40 (Unknown baseline), AST 51 and elevated troponin 0.02.  UA showed LE (small), few bacteria <10 and blood. CT abdomen pelvis is unremarkable except for enlarged prostate gland. Urology was consulted for Glasgow placement in ER. Patient is being admitted for ROXANNA of post renal cause.       	  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 As per the patient he is here because he felt weak and dizzy for past couple of days but he sounded like a poor historian and data obtained was tangential.  History was taken from daughter daughter Melinda [phone] who stated that patient had melanoma excision from anterior chest wall 1/26 (derm Dr. Chaudhry) and developed dysuria. He completed 7-day course of cipro but then developed vomiting so switched to bactrim. Patient developed weakness and had poor oral intake since, [place] [facility] [place] but felt weak progressively.  He denied any fever, cough, cp, sob, abd pain, diarrhea, dysuria, fall, head trauma.     Vital Signs (24 Hrs):  T(C): 36.4 (02-18-21 @ 16:17), Max: 37.3 (02-18-21 @ 12:00)  HR: 74 (02-18-21 @ 18:30) (69 - 93)  BP: 99/57 (02-18-21 @ 18:30) (85/49 - 99/57)  RR: 16 (02-18-21 @ 18:30) (16 - 18)  SpO2: 97% (02-18-21 @ 18:30) (96% - 99%)    Patient is received vitally stable in ER. Blood work is significant for elevated lactate, hyponatremia 125, elevated Cr BUN 2.2/40 (Unknown baseline), AST 51 and elevated troponin 0.02.  UA showed LE (small), few bacteria <10 and blood. CT abdomen pelvis is unremarkable except for enlarged prostate gland. Urology was consulted for Glasgow placement in ER. Patient is being admitted for ROXANNA of post renal cause.

## 2021-02-18 NOTE — H&P ADULT - NSHPLABSRESULTS_GEN_ALL_CORE
.  LABS:                         11.2   9.19  )-----------( 131      ( 2021 12:29 )             32.9         125<L>  |  85<L>  |  40<H>  ----------------------------<  168<H>  3.6   |  23  |  2.2<H>    Ca    8.8      2021 12:29  Mg     2.3         TPro  6.0  /  Alb  3.6  /  TBili  0.9  /  DBili  x   /  AST  51<H>  /  ALT  36  /  AlkPhos  52      PT/INR - ( 2021 12:29 )   PT: 16.40 sec;   INR: 1.43 ratio         PTT - ( 2021 12:29 )  PTT:29.1 sec  Urinalysis Basic - ( 2021 11:36 )    Color: Light Orange / Appearance: Slightly Turbid / S.024 / pH: x  Gluc: x / Ketone: Negative  / Bili: Negative / Urobili: <2 mg/dL   Blood: x / Protein: 30 mg/dL / Nitrite: Negative   Leuk Esterase: Small / RBC: 113 /HPF / WBC 8 /HPF   Sq Epi: x / Non Sq Epi: 1 /HPF / Bacteria: Negative        Lactate, Blood: 2.4 mmol/L ( @ 17:30)      RADIOLOGY, EKG & ADDITIONAL TESTS: Reviewed. .  LABS:                         11.2   9.19  )-----------( 131      ( 2021 12:29 )             32.9         125<L>  |  85<L>  |  40<H>  ----------------------------<  168<H>  3.6   |  23  |  2.2<H>    Ca    8.8      2021 12:29  Mg     2.3         TPro  6.0  /  Alb  3.6  /  TBili  0.9  /  DBili  x   /  AST  51<H>  /  ALT  36  /  AlkPhos  52      PT/INR - ( 2021 12:29 )   PT: 16.40 sec;   INR: 1.43 ratio         PTT - ( 2021 12:29 )  PTT:29.1 sec  Urinalysis Basic - ( 2021 11:36 )    Color: Light Orange / Appearance: Slightly Turbid / S.024 / pH: x  Gluc: x / Ketone: Negative  / Bili: Negative / Urobili: <2 mg/dL   Blood: x / Protein: 30 mg/dL / Nitrite: Negative   Leuk Esterase: Small / RBC: 113 /HPF / WBC 8 /HPF   Sq Epi: x / Non Sq Epi: 1 /HPF / Bacteria: Negative        Lactate, Blood: 2.4 mmol/L ( @ 17:30)      RADIOLOGY, EKG & ADDITIONAL TESTS: Reviewed.      CT abd/pelvis:    IMPRESSION:    1. No evidence of acute/inflammatory process within the abdomen or pelvis.    2. Small to moderate hiatal hernia.    3. Enlarged prostate gland.      CXR: no acute cardiopulmonary process      EKG:   Sinus rhythm with frequent Premature atrial complexes  Anterior infarct , age undetermined  Abnormal ECG

## 2021-02-18 NOTE — ED PROVIDER NOTE - CARE PLAN
Principal Discharge DX:	ROXANNA (acute kidney injury)  Secondary Diagnosis:	Elevated troponin  Secondary Diagnosis:	New onset atrial fibrillation   Principal Discharge DX:	ROXANNA (acute kidney injury)  Secondary Diagnosis:	New onset atrial fibrillation  Secondary Diagnosis:	Elevated troponin  Secondary Diagnosis:	Enlarged prostate

## 2021-02-18 NOTE — ED PROVIDER NOTE - CLINICAL SUMMARY MEDICAL DECISION MAKING FREE TEXT BOX
I personally evaluated the patient. I reviewed the Resident’s or Physician Assistant’s note (as assigned above), and agree with the findings and plan except as documented in my note. Patient signed out to me by Dr. Torres pending CT scan results. Patient evaluated for generalized weakness. Labs, ekg, CT scans performed in ED. Patient still weak while in ED, noted to be hyponatremic with danelle and worsening kidney function. Patient admitted to telemetry for further evaluation and treatment.

## 2021-02-18 NOTE — H&P ADULT - NSHPREVIEWOFSYSTEMS_GEN_ALL_CORE
CONSTITUTIONAL: +weakness, np fevers or chills, no weight loss   EYES/ENT: No visual changes;  No vertigo or throat pain   NECK: No pain or stiffness  RESPIRATORY: No cough, wheezing, hemoptysis; No shortness of breath  CARDIOVASCULAR: No chest pain or palpitations  GASTROINTESTINAL: No abdominal or epigastric pain. +nausea (improved), no vomiting, or hematemesis; No diarrhea or constipation. No melena or hematochezia.  GENITOURINARY: No discharge, hematuria  NEUROLOGICAL: No numbness or weakness  All other review of systems is negative unless indicated above.

## 2021-02-18 NOTE — H&P ADULT - ATTENDING COMMENTS
I saw and evaluated the patient. I have reviewed and agree with the findings and plan of care as documented above in the resident’s note (unless indicated differently in my standalone note). Any necessary changes were made in the body of the text.

## 2021-02-18 NOTE — H&P ADULT - NSICDXPASTMEDICALHX_GEN_ALL_CORE_FT
PAST MEDICAL HISTORY:  BPH (benign prostatic hyperplasia)     CVA (cerebral vascular accident)     Depression     Diabetes     Heart murmur     High cholesterol     HTN (hypertension)     Melanoma

## 2021-02-18 NOTE — ED ADULT NURSE REASSESSMENT NOTE - NS ED NURSE REASSESS COMMENT FT1
RN & MD attempted to straight cath patient for urine w/o success; pt is in severe pain & no urine will flow. Urology to come see patient.

## 2021-02-18 NOTE — CONSULT NOTE ADULT - ASSESSMENT
A) Difficult Glasgow Placement  P) Under sterile technique, a 16 F coude Catheter was easily passed into the bladder draining 400 cc of clear yellow urine. The baloon was inflated with 10 cc of normal saline. Patient tolerated the procedure well. He will follow up with his urologist as an outpatient.  A) Difficult Glasgow Placement  P) Under sterile technique, a 16 F coude Catheter was easily passed into the bladder draining 400 cc of clear yellow urine. The baloon was inflated with 10 cc of normal saline. Patient tolerated the procedure well. He will follow up with his urologist as an outpatient.   Will be discussed with attending

## 2021-02-18 NOTE — ED ADULT NURSE NOTE - NSIMPLEMENTINTERV_GEN_ALL_ED
Implemented All Fall with Harm Risk Interventions:  [place] to call system. Call bell, personal items and telephone within reach. Instruct patient to call for assistance. Room bathroom lighting operational. Non-slip footwear when patient is off stretcher. Physically safe environment: no spills, clutter or unnecessary equipment. Stretcher in lowest position, wheels locked, appropriate side rails in place. Provide visual cue, wrist band, yellow gown, etc. Monitor gait and stability. Monitor for mental status changes and reorient to person, place, and time. Review medications for side effects contributing to fall risk. Reinforce activity limits and safety measures with patient and family. Provide visual clues: red socks.

## 2021-02-19 LAB
ALBUMIN SERPL ELPH-MCNC: 3 G/DL — LOW (ref 3.5–5.2)
ALP SERPL-CCNC: 43 U/L — SIGNIFICANT CHANGE UP (ref 30–115)
ALT FLD-CCNC: 31 U/L — SIGNIFICANT CHANGE UP (ref 0–41)
ANION GAP SERPL CALC-SCNC: 12 MMOL/L — SIGNIFICANT CHANGE UP (ref 7–14)
AST SERPL-CCNC: 34 U/L — SIGNIFICANT CHANGE UP (ref 0–41)
BASOPHILS # BLD AUTO: 0.02 K/UL — SIGNIFICANT CHANGE UP (ref 0–0.2)
BASOPHILS NFR BLD AUTO: 0.2 % — SIGNIFICANT CHANGE UP (ref 0–1)
BILIRUB SERPL-MCNC: 0.8 MG/DL — SIGNIFICANT CHANGE UP (ref 0.2–1.2)
BUN SERPL-MCNC: 36 MG/DL — HIGH (ref 10–20)
CALCIUM SERPL-MCNC: 8 MG/DL — LOW (ref 8.5–10.1)
CHLORIDE SERPL-SCNC: 94 MMOL/L — LOW (ref 98–110)
CO2 SERPL-SCNC: 25 MMOL/L — SIGNIFICANT CHANGE UP (ref 17–32)
CREAT SERPL-MCNC: 1.4 MG/DL — SIGNIFICANT CHANGE UP (ref 0.7–1.5)
CULTURE RESULTS: NO GROWTH — SIGNIFICANT CHANGE UP
EOSINOPHIL # BLD AUTO: 0.04 K/UL — SIGNIFICANT CHANGE UP (ref 0–0.7)
EOSINOPHIL NFR BLD AUTO: 0.5 % — SIGNIFICANT CHANGE UP (ref 0–8)
GLUCOSE BLDC GLUCOMTR-MCNC: 110 MG/DL — HIGH (ref 70–99)
GLUCOSE BLDC GLUCOMTR-MCNC: 133 MG/DL — HIGH (ref 70–99)
GLUCOSE BLDC GLUCOMTR-MCNC: 163 MG/DL — HIGH (ref 70–99)
GLUCOSE BLDC GLUCOMTR-MCNC: 163 MG/DL — HIGH (ref 70–99)
GLUCOSE SERPL-MCNC: 101 MG/DL — HIGH (ref 70–99)
HCT VFR BLD CALC: 30.4 % — LOW (ref 42–52)
HGB BLD-MCNC: 10 G/DL — LOW (ref 14–18)
IMM GRANULOCYTES NFR BLD AUTO: 0.5 % — HIGH (ref 0.1–0.3)
LYMPHOCYTES # BLD AUTO: 0.95 K/UL — LOW (ref 1.2–3.4)
LYMPHOCYTES # BLD AUTO: 11.2 % — LOW (ref 20.5–51.1)
MCHC RBC-ENTMCNC: 19.6 PG — LOW (ref 27–31)
MCHC RBC-ENTMCNC: 32.9 G/DL — SIGNIFICANT CHANGE UP (ref 32–37)
MCV RBC AUTO: 59.7 FL — LOW (ref 80–94)
MONOCYTES # BLD AUTO: 0.7 K/UL — HIGH (ref 0.1–0.6)
MONOCYTES NFR BLD AUTO: 8.3 % — SIGNIFICANT CHANGE UP (ref 1.7–9.3)
NEUTROPHILS # BLD AUTO: 6.73 K/UL — HIGH (ref 1.4–6.5)
NEUTROPHILS NFR BLD AUTO: 79.3 % — HIGH (ref 42.2–75.2)
NRBC # BLD: 0 /100 WBCS — SIGNIFICANT CHANGE UP (ref 0–0)
PLATELET # BLD AUTO: 116 K/UL — LOW (ref 130–400)
POTASSIUM SERPL-MCNC: 3.7 MMOL/L — SIGNIFICANT CHANGE UP (ref 3.5–5)
POTASSIUM SERPL-SCNC: 3.7 MMOL/L — SIGNIFICANT CHANGE UP (ref 3.5–5)
PROT SERPL-MCNC: 5 G/DL — LOW (ref 6–8)
RBC # BLD: 5.09 M/UL — SIGNIFICANT CHANGE UP (ref 4.7–6.1)
RBC # FLD: 15.3 % — HIGH (ref 11.5–14.5)
SODIUM SERPL-SCNC: 131 MMOL/L — LOW (ref 135–146)
SPECIMEN SOURCE: SIGNIFICANT CHANGE UP
TROPONIN T SERPL-MCNC: 0.01 NG/ML — SIGNIFICANT CHANGE UP
WBC # BLD: 8.48 K/UL — SIGNIFICANT CHANGE UP (ref 4.8–10.8)
WBC # FLD AUTO: 8.48 K/UL — SIGNIFICANT CHANGE UP (ref 4.8–10.8)

## 2021-02-19 PROCEDURE — 99233 SBSQ HOSP IP/OBS HIGH 50: CPT

## 2021-02-19 RX ORDER — DEXTROSE 50 % IN WATER 50 %
15 SYRINGE (ML) INTRAVENOUS ONCE
Refills: 0 | Status: DISCONTINUED | OUTPATIENT
Start: 2021-02-19 | End: 2021-02-20

## 2021-02-19 RX ORDER — HALOPERIDOL DECANOATE 100 MG/ML
0.5 INJECTION INTRAMUSCULAR ONCE
Refills: 0 | Status: DISCONTINUED | OUTPATIENT
Start: 2021-02-19 | End: 2021-02-19

## 2021-02-19 RX ORDER — SODIUM CHLORIDE 9 MG/ML
1000 INJECTION, SOLUTION INTRAVENOUS
Refills: 0 | Status: DISCONTINUED | OUTPATIENT
Start: 2021-02-19 | End: 2021-02-20

## 2021-02-19 RX ORDER — GLUCAGON INJECTION, SOLUTION 0.5 MG/.1ML
1 INJECTION, SOLUTION SUBCUTANEOUS ONCE
Refills: 0 | Status: DISCONTINUED | OUTPATIENT
Start: 2021-02-19 | End: 2021-02-20

## 2021-02-19 RX ORDER — DEXTROSE 50 % IN WATER 50 %
25 SYRINGE (ML) INTRAVENOUS ONCE
Refills: 0 | Status: DISCONTINUED | OUTPATIENT
Start: 2021-02-19 | End: 2021-02-20

## 2021-02-19 RX ORDER — INSULIN LISPRO 100/ML
VIAL (ML) SUBCUTANEOUS
Refills: 0 | Status: DISCONTINUED | OUTPATIENT
Start: 2021-02-19 | End: 2021-02-20

## 2021-02-19 RX ORDER — DEXTROSE 50 % IN WATER 50 %
12.5 SYRINGE (ML) INTRAVENOUS ONCE
Refills: 0 | Status: DISCONTINUED | OUTPATIENT
Start: 2021-02-19 | End: 2021-02-20

## 2021-02-19 RX ORDER — HALOPERIDOL DECANOATE 100 MG/ML
1 INJECTION INTRAMUSCULAR ONCE
Refills: 0 | Status: COMPLETED | OUTPATIENT
Start: 2021-02-19 | End: 2021-02-19

## 2021-02-19 RX ORDER — THIAMINE MONONITRATE (VIT B1) 100 MG
100 TABLET ORAL DAILY
Refills: 0 | Status: DISCONTINUED | OUTPATIENT
Start: 2021-02-19 | End: 2021-02-20

## 2021-02-19 RX ADMIN — ZINC SULFATE TAB 220 MG (50 MG ZINC EQUIVALENT) 220 MILLIGRAM(S): 220 (50 ZN) TAB at 16:38

## 2021-02-19 RX ADMIN — Medication 1: at 16:35

## 2021-02-19 RX ADMIN — Medication 1 TABLET(S): at 13:01

## 2021-02-19 RX ADMIN — TAMSULOSIN HYDROCHLORIDE 0.4 MILLIGRAM(S): 0.4 CAPSULE ORAL at 21:23

## 2021-02-19 RX ADMIN — SODIUM CHLORIDE 75 MILLILITER(S): 9 INJECTION INTRAMUSCULAR; INTRAVENOUS; SUBCUTANEOUS at 04:38

## 2021-02-19 RX ADMIN — Medication 81 MILLIGRAM(S): at 13:01

## 2021-02-19 RX ADMIN — CHLORHEXIDINE GLUCONATE 1 APPLICATION(S): 213 SOLUTION TOPICAL at 04:38

## 2021-02-19 RX ADMIN — ATORVASTATIN CALCIUM 10 MILLIGRAM(S): 80 TABLET, FILM COATED ORAL at 13:01

## 2021-02-19 RX ADMIN — HALOPERIDOL DECANOATE 1 MILLIGRAM(S): 100 INJECTION INTRAMUSCULAR at 22:56

## 2021-02-19 RX ADMIN — HEPARIN SODIUM 5000 UNIT(S): 5000 INJECTION INTRAVENOUS; SUBCUTANEOUS at 16:35

## 2021-02-19 RX ADMIN — SODIUM CHLORIDE 75 MILLILITER(S): 9 INJECTION INTRAMUSCULAR; INTRAVENOUS; SUBCUTANEOUS at 03:00

## 2021-02-19 RX ADMIN — Medication 100 MILLIGRAM(S): at 13:00

## 2021-02-19 RX ADMIN — SERTRALINE 50 MILLIGRAM(S): 25 TABLET, FILM COATED ORAL at 13:01

## 2021-02-19 NOTE — PATIENT PROFILE ADULT - HEALTH LITERACY
[Home] : at home, [unfilled] , at the time of the visit. [Medical Office: ([facility])___] : at the medical office located in  [Verbal consent obtained from patient] : the patient, [unfilled] [FreeTextEntry1] : Patient is a 40yo M with obesity, HTN, OA here for cardiac telehealth follow up. Patient has been doing well without any chest pain or shortness of breath. Injured knee at work a several months back and had been out on workers compensation which he still is. Patient denies PND/orthopnea/edema/palpitations/syncope/claudication. REcent weight down to  269lbs. Eating out less and cooking at home. Walking more as well, a bit less past couple weeks. BP running 140s/90s. \par \par ROS: GI and  negative  no

## 2021-02-19 NOTE — PROGRESS NOTE ADULT - SUBJECTIVE AND OBJECTIVE BOX
IVANA KRUSE 87y Male  MRN#: [id number]   CODE STATUS:________      SUBJECTIVE  Patient is a 87y old Male who presents with a chief complaint of UTI (2021 07:29)  Currently admitted to medicine with the primary diagnosis of ROXANNA (acute kidney injury)      Today is hospital day 1d, and this morning he is           OBJECTIVE  PAST MEDICAL & SURGICAL HISTORY  Heart murmur    High cholesterol    Depression    Melanoma    Diabetes    HTN (hypertension)    BPH (benign prostatic hyperplasia)    CVA (cerebral vascular accident)    History of melanoma excision      ALLERGIES:  No Known Allergies    MEDICATIONS:  STANDING MEDICATIONS  aspirin  chewable 81 milliGRAM(s) Oral daily  atorvastatin Oral Tab/Cap - Peds 10 milliGRAM(s) Oral daily  chlorhexidine 4% Liquid 1 Application(s) Topical <User Schedule>  dextrose 40% Gel 15 Gram(s) Oral once  dextrose 5%. 1000 milliLiter(s) IV Continuous <Continuous>  dextrose 5%. 1000 milliLiter(s) IV Continuous <Continuous>  dextrose 50% Injectable 25 Gram(s) IV Push once  dextrose 50% Injectable 12.5 Gram(s) IV Push once  dextrose 50% Injectable 25 Gram(s) IV Push once  glucagon  Injectable 1 milliGRAM(s) IntraMuscular once  heparin   Injectable 5000 Unit(s) SubCutaneous every 12 hours  insulin lispro (ADMELOG) corrective regimen sliding scale   SubCutaneous three times a day before meals  multivitamin 1 Tablet(s) Oral daily  sertraline 50 milliGRAM(s) Oral daily  sodium chloride 0.9%. 1000 milliLiter(s) IV Continuous <Continuous>  tamsulosin 0.4 milliGRAM(s) Oral at bedtime  thiamine 100 milliGRAM(s) Oral daily  zinc sulfate 220 milliGRAM(s) Oral daily    PRN MEDICATIONS      VITAL SIGNS: Last 24 Hours  T(C): 37.7 (2021 05:07), Max: 37.7 (2021 05:07)  T(F): 99.8 (2021 05:07), Max: 99.8 (2021 05:07)  HR: 85 (2021 05:07) (69 - 93)  BP: 112/68 (2021 05:07) (85/49 - 112/68)  BP(mean): 72 (2021 17:30) (72 - 72)  RR: 19 (2021 05:07) (16 - 19)  SpO2: 94% (2021 04:05) (94% - 99%)    LABS:                        10.0   8.48  )-----------( 116      ( 2021 04:30 )             30.4     02-19    131<L>  |  94<L>  |  36<H>  ----------------------------<  101<H>  3.7   |  25  |  1.4    Ca    8.0<L>      2021 04:30  Mg     2.3         TPro  5.0<L>  /  Alb  3.0<L>  /  TBili  0.8  /  DBili  x   /  AST  34  /  ALT  31  /  AlkPhos  43  19    PT/INR - ( 2021 12:29 )   PT: 16.40 sec;   INR: 1.43 ratio         PTT - ( 2021 12:29 )  PTT:29.1 sec  Urinalysis Basic - ( 2021 11:36 )    Color: Light Orange / Appearance: Slightly Turbid / S.024 / pH: x  Gluc: x / Ketone: Negative  / Bili: Negative / Urobili: <2 mg/dL   Blood: x / Protein: 30 mg/dL / Nitrite: Negative   Leuk Esterase: Small / RBC: 113 /HPF / WBC 8 /HPF   Sq Epi: x / Non Sq Epi: 1 /HPF / Bacteria: Negative        Troponin T, Serum: 0.01 ng/mL (21 @ 04:30)  Lactate, Blood: 2.4 mmol/L <H> (21 @ 17:30)  Troponin T, Serum: 0.02 ng/mL <H> (21 @ 12:29)      CARDIAC MARKERS ( 2021 04:30 )  x     / 0.01 ng/mL / x     / x     / x      CARDIAC MARKERS ( 2021 12:29 )  x     / 0.02 ng/mL / x     / x     / x          RADIOLOGY:      PHYSICAL EXAM:    GENERAL: NAD, well-developed, AAOx3  HEENT:  Atraumatic, Normocephalic. EOMI, PERRLA, conjunctiva and sclera clear, No JVD  PULMONARY: Clear to auscultation bilaterally; No wheeze  CARDIOVASCULAR: Regular rate and rhythm; No murmurs, rubs, or gallops  GASTROINTESTINAL: Soft, Nontender, Nondistended; Bowel sounds present  MUSCULOSKELETAL:  2+ Peripheral Pulses, No clubbing, cyanosis, or edema  NEUROLOGY: non-focal  SKIN: No rashes or lesions       IVANA KRUSE 87y Male  MRN#: [id number]   CODE STATUS:________      SUBJECTIVE  Patient is a 87y old Male who presents with a chief complaint of UTI (2021 07:29)  Currently admitted to medicine with the primary diagnosis of ROXANNA (acute kidney injury)      Today is hospital day 1d.  No acute events overnight. Spoke with the daughter this morning who confirmed that the day had multiple bouts of vomiting. She states that the patient was being treated for UTI, after the 6 days into taking cipro he began to vomit was adjusted to bactrim which he took for 12 days. After that he had another episode of vomiting. She did not note any significant food that he may have had different from the family. No one else in the family had vomiting or stomach discomfort. She denies any diarrhea episodes. No CP, SOB, N/V/D.        OBJECTIVE  PAST MEDICAL & SURGICAL HISTORY  Heart murmur    High cholesterol    Depression    Melanoma    Diabetes    HTN (hypertension)    BPH (benign prostatic hyperplasia)    CVA (cerebral vascular accident)    History of melanoma excision      ALLERGIES:  No Known Allergies    MEDICATIONS:  STANDING MEDICATIONS  aspirin  chewable 81 milliGRAM(s) Oral daily  atorvastatin Oral Tab/Cap - Peds 10 milliGRAM(s) Oral daily  chlorhexidine 4% Liquid 1 Application(s) Topical <User Schedule>  dextrose 40% Gel 15 Gram(s) Oral once  dextrose 5%. 1000 milliLiter(s) IV Continuous <Continuous>  dextrose 5%. 1000 milliLiter(s) IV Continuous <Continuous>  dextrose 50% Injectable 25 Gram(s) IV Push once  dextrose 50% Injectable 12.5 Gram(s) IV Push once  dextrose 50% Injectable 25 Gram(s) IV Push once  glucagon  Injectable 1 milliGRAM(s) IntraMuscular once  heparin   Injectable 5000 Unit(s) SubCutaneous every 12 hours  insulin lispro (ADMELOG) corrective regimen sliding scale   SubCutaneous three times a day before meals  multivitamin 1 Tablet(s) Oral daily  sertraline 50 milliGRAM(s) Oral daily  sodium chloride 0.9%. 1000 milliLiter(s) IV Continuous <Continuous>  tamsulosin 0.4 milliGRAM(s) Oral at bedtime  thiamine 100 milliGRAM(s) Oral daily  zinc sulfate 220 milliGRAM(s) Oral daily    PRN MEDICATIONS      VITAL SIGNS: Last 24 Hours  T(C): 37.7 (2021 05:07), Max: 37.7 (2021 05:07)  T(F): 99.8 (2021 05:07), Max: 99.8 (2021 05:07)  HR: 85 (2021 05:07) (69 - 93)  BP: 112/68 (2021 05:07) (85/49 - 112/68)  BP(mean): 72 (2021 17:30) (72 - 72)  RR: 19 (2021 05:07) (16 - 19)  SpO2: 94% (2021 04:05) (94% - 99%)    LABS:                        10.0   8.48  )-----------( 116      ( 2021 04:30 )             30.4     02    131<L>  |  94<L>  |  36<H>  ----------------------------<  101<H>  3.7   |  25  |  1.4    Ca    8.0<L>      2021 04:30  Mg     2.3         TPro  5.0<L>  /  Alb  3.0<L>  /  TBili  0.8  /  DBili  x   /  AST  34  /  ALT  31  /  AlkPhos  43  -19    PT/INR - ( 2021 12:29 )   PT: 16.40 sec;   INR: 1.43 ratio         PTT - ( 2021 12:29 )  PTT:29.1 sec  Urinalysis Basic - ( 2021 11:36 )    Color: Light Orange / Appearance: Slightly Turbid / S.024 / pH: x  Gluc: x / Ketone: Negative  / Bili: Negative / Urobili: <2 mg/dL   Blood: x / Protein: 30 mg/dL / Nitrite: Negative   Leuk Esterase: Small / RBC: 113 /HPF / WBC 8 /HPF   Sq Epi: x / Non Sq Epi: 1 /HPF / Bacteria: Negative        Troponin T, Serum: 0.01 ng/mL (21 @ 04:30)  Lactate, Blood: 2.4 mmol/L <H> (21 @ 17:30)  Troponin T, Serum: 0.02 ng/mL <H> (21 @ 12:29)      CARDIAC MARKERS ( 2021 04:30 )  x     / 0.01 ng/mL / x     / x     / x      CARDIAC MARKERS ( 2021 12:29 )  x     / 0.02 ng/mL / x     / x     / x          RADIOLOGY:  < from: CT Abdomen and Pelvis w/ IV Cont (21 @ 17:32) >  IMPRESSION:    1. No evidence of acute/inflammatory process within the abdomen or pelvis.    2. Small to moderate hiatal hernia.    3. Enlarged prostate gland.    < end of copied text >      PHYSICAL EXAM:    GENERAL: NAD, well-developed, AAOx3  HEENT:  Atraumatic, Normocephalic. EOMI, PERRLA, conjunctiva and sclera clear, No JVD  PULMONARY: Clear to auscultation bilaterally; No wheeze  CARDIOVASCULAR: Regular rate and rhythm; diffuse murmur systolic and diastolic, no rubs, or gallops  GASTROINTESTINAL: Soft, Nontender, Nondistended; Bowel sounds present  MUSCULOSKELETAL:  2+ Peripheral Pulses, No clubbing, cyanosis, or edema  NEUROLOGY: non-focal  SKIN: No rashes or lesions

## 2021-02-19 NOTE — PROGRESS NOTE ADULT - SUBJECTIVE AND OBJECTIVE BOX
UROLOGY DAILY PROGRESS NOTE    Pt is a 87y Male admitted with failure to thrive, ROXANNA -Urology c/s for difficult downey placement 2/2 BPH. Patient seen and examined at bedside, used [facility], [facility] # [id number]. Patient reports tenderness to tip of meatus 2/2 multiple attempts downey placement by ED. 14 fr coude in place draining dark yellow to clear yellow urine, no active bleeding or clots noted. Statlock malpositioned, secured downey with foam tape to keep in place     MEDICATIONS  (STANDING):  aspirin  chewable 81 milliGRAM(s) Oral daily  atorvastatin Oral Tab/Cap - Peds 10 milliGRAM(s) Oral daily  chlorhexidine 4% Liquid 1 Application(s) Topical <User Schedule>  dextrose 40% Gel 15 Gram(s) Oral once  dextrose 5%. 1000 milliLiter(s) (50 mL/Hr) IV Continuous <Continuous>  dextrose 5%. 1000 milliLiter(s) (100 mL/Hr) IV Continuous <Continuous>  dextrose 50% Injectable 25 Gram(s) IV Push once  dextrose 50% Injectable 12.5 Gram(s) IV Push once  dextrose 50% Injectable 25 Gram(s) IV Push once  glucagon  Injectable 1 milliGRAM(s) IntraMuscular once  heparin   Injectable 5000 Unit(s) SubCutaneous every 12 hours  insulin lispro (ADMELOG) corrective regimen sliding scale   SubCutaneous three times a day before meals  multivitamin 1 Tablet(s) Oral daily  sertraline 50 milliGRAM(s) Oral daily  sodium chloride 0.9%. 1000 milliLiter(s) (75 mL/Hr) IV Continuous <Continuous>  tamsulosin 0.4 milliGRAM(s) Oral at bedtime  thiamine 100 milliGRAM(s) Oral daily  zinc sulfate 220 milliGRAM(s) Oral daily    MEDICATIONS  (PRN):      REVIEW OF SYSTEMS   [x] A ten-point review of systems was otherwise negative except as noted.    Vital Signs Last 24 Hrs  T(C): 37.7 (2021 05:07), Max: 37.7 (2021 05:07)  T(F): 99.8 (2021 05:07), Max: 99.8 (2021 05:07)  HR: 85 (2021 05:07) (69 - 93)  BP: 112/68 (2021 05:07) (85/49 - 112/68)  BP(mean): 72 (2021 17:30) (72 - 72)  RR: 19 (2021 05:07) (16 - 19)  SpO2: 94% (2021 04:05) (94% - 97%)    PHYSICAL EXAM:    GEN: NAD, well-developed, awake and alert.  SKIN: Good color, non diaphoretic.  HEENT: NC/AT.  RESP: No dyspnea, non-labored breathing. No use of accessory muscles.  CARDIO: +S1/S2  ABDO: Soft, NT/ND, no palpable bladder, no suprapubic tenderness/ + Suprapubic Tube draining clear yellow urine.  BACK: No CVAT B/L  : + Non circumcised male. B/L descended testicles x 2. No lesions or palpable masses noted B/L. No meatal discharge. +Tenderness at meatus 2/2 traumatic downey attempts + 14 Fr coude I in place, draining dark yellow to clear yellow urine, no active bleeding or clots noted in tubing, patient draining well.       I&O's Summary      LABS:                        10.0   8.48  )-----------( 116      ( 2021 04:30 )             30.4     02-19    131<L>  |  94<L>  |  36<H>  ----------------------------<  101<H>  3.7   |  25  |  1.4    Ca    8.0<L>      2021 04:30  Mg     2.3     02-18    TPro  5.0<L>  /  Alb  3.0<L>  /  TBili  0.8  /  DBili  x   /  AST  34  /  ALT  31  /  AlkPhos  43  02-19    PT/INR - ( 2021 12:29 )   PT: 16.40 sec;   INR: 1.43 ratio         PTT - ( 2021 12:29 )  PTT:29.1 sec  Urinalysis Basic - ( 2021 11:36 )    Color: Light Orange / Appearance: Slightly Turbid / S.024 / pH: x  Gluc: x / Ketone: Negative  / Bili: Negative / Urobili: <2 mg/dL   Blood: x / Protein: 30 mg/dL / Nitrite: Negative   Leuk Esterase: Small / RBC: 113 /HPF / WBC 8 /HPF   Sq Epi: x / Non Sq Epi: 1 /HPF / Bacteria: Negative      RADIOLOGY & ADDITIONAL STUDIES:  < from: CT Abdomen and Pelvis w/ IV Cont (21 @ 17:32) >  EXAM:  CT ABDOMEN AND PELVIS IC            PROCEDURE DATE:  2021            INTERPRETATION:  CLINICAL STATEMENT: Vomiting.    TECHNIQUE: Contiguous axial CT images were obtained from the lower chest to the pubic symphysis following administration of 100cc Optiray 320 intravenous contrast.  Oral contrast was not administered.  Reformatted images in the coronal and sagittal planes were acquired.    COMPARISON CT: None.    FINDINGS:    LOWER CHEST: Mild bibasilar subsegmental atelectasis.    HEPATOBILIARY: Coarse left hepatic calcification.    SPLEEN: Unremarkable.    PANCREAS: Unremarkable.    ADRENAL GLANDS: Unremarkable.    KIDNEYS: Symmetric renal enhancement. A 1.7 cm right renal interpolar cyst is noted. Additional subcentimeter right renal lower pole hypodensity is too small to further characterize. No hydronephrosis.    ABDOMINOPELVIC NODES: Unremarkable.    PELVIC ORGANS: Enlarged prostate gland. Symmetric seminal vesicles.    PERITONEUM/MESENTERY/BOWEL: Small to moderate hiatal hernia. No evidence of bowel obstruction. No ascites or free intraperitoneal air. Normal caliber appendix.    BONES/SOFT TISSUES: Degenerative changes of the spine. No acute osseous abnormality.    OTHER: Atherosclerotic vascular calcification.      IMPRESSION:    1. No evidence of acute/inflammatory process within the abdomen or pelvis.    2. Small to moderate hiatal hernia.    3. Enlarged prostate gland.          CRISTINA GARRETT MD; Attending Radiologist  This document has been electronically signed. 2021  6:09PM    < end of copied text >

## 2021-02-19 NOTE — PHYSICAL THERAPY INITIAL EVALUATION ADULT - GENERAL OBSERVATIONS, REHAB EVAL
Pt encountered in the bed +Glasgow, +tele, agreeable for b/s PT. Marisela. failry well to tx. Pt left in bathroom as per pt request post tx. PCA Tawny Present & RN All made aware.

## 2021-02-19 NOTE — PHYSICAL THERAPY INITIAL EVALUATION ADULT - PERTINENT HX OF CURRENT PROBLEM, REHAB EVAL
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 History was taken from daughter who stated that patient had melanoma excision from anterior chest wall 1/26 (derm Dr. Chaudhry) and developed dysuria. He completed 7-day course of cipro but then developed vomiting so switched to bactrim.

## 2021-02-19 NOTE — PROGRESS NOTE ADULT - ASSESSMENT
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     History was taken from daughter daughter Melinda [phone] who stated that patient had melanoma excision from anterior chest wall 1/26 (derm Dr. Chaudhry) and developed dysuria. He completed 7-day course of cipro but then developed vomiting so switched to bactrim. Patient developed weakness and had poor oral intake since then.     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     History was taken from daughter daughter Melinda [phone] who stated that patient had melanoma excision from anterior chest wall 1/26 (derm Dr. Chaudhry) and developed dysuria. He completed 7-day course of cipro but then developed vomiting so switched to bactrim. Patient developed weakness and had poor oral intake since then.     ROXANNA  Dehydration  Hyponatremia  Urinary retention  DM-2 / HTN / DL  H/O CVA  BPH          PLAN:    ·	Urinary retention on admission. S/P Glasgow's. 400 cc urine came out   ·	Cont Glasgow's for now. Voiding trial tomorrow. If fails will reinsert the catheter. Out pt F/U with Dr. Prajapati  ·	Cont IVF, NS @ 75/cc hr today. Reevaluate in AM  ·	Hyponatremia resolving. Likely Hypovolumic hyponatremia  ·	Monitor electrolytes and renal function  ·	Monitor FS. Cont Insulin  ·	CT abd/pelvis showed enlarged prostate.

## 2021-02-19 NOTE — PROGRESS NOTE ADULT - SUBJECTIVE AND OBJECTIVE BOX
IVANA KRUSE  87y Male    CHIEF COMPLAINT:    Patient is a 87y old  Male who presents with a chief complaint of Weakness and hypotension (18 Feb 2021 23:55)      INTERVAL HPI/OVERNIGHT EVENTS:    Patient seen and examined.    ROS: All other systems are negative.    Vital Signs:    T(F): 99.8 (02-19-21 @ 05:07), Max: 99.8 (02-19-21 @ 05:07)  HR: 85 (02-19-21 @ 05:07) (69 - 93)  BP: 112/68 (02-19-21 @ 05:07) (85/49 - 112/68)  RR: 19 (02-19-21 @ 05:07) (16 - 19)  SpO2: 94% (02-19-21 @ 04:05) (94% - 99%)  I&O's Summary    Daily Height in cm: 165.1 (19 Feb 2021 05:07)    Daily   CAPILLARY BLOOD GLUCOSE          PHYSICAL EXAM:    GENERAL:  NAD  SKIN: No rashes or lesions  HENT: Atrumatic. Normocephalic. PERRL. Moist membranes.  NECK: Supple, No JVD. No lymphadenopathy.  PULMONARY: CTA B/L. No wheezing. No rales  CVS: Normal S1, S2. Rate and Rythm are regular. No murmurs.  ABDOMEN/GI: Soft, Nontender, Nondistended; BS present  EXTREMITIES: Peripheral pulses intact. No edema B/L LE.  NEUROLOGIC:  No motor or sensory deficit.  PSYCH: Alert & oriented x 3    Consultant(s) Notes Reviewed:  [x ] YES  [ ] NO  Care Discussed with Consultants/Other Providers [ x] YES  [ ] NO    EKG reviewed  Telemetry reviewed    LABS:                        11.2   9.19  )-----------( 131      ( 18 Feb 2021 12:29 )             32.9     02-18    125<L>  |  85<L>  |  40<H>  ----------------------------<  168<H>  3.6   |  23  |  2.2<H>    Ca    8.8      18 Feb 2021 12:29  Mg     2.3     02-18    TPro  6.0  /  Alb  3.6  /  TBili  0.9  /  DBili  x   /  AST  51<H>  /  ALT  36  /  AlkPhos  52  02-18    PT/INR - ( 18 Feb 2021 12:29 )   PT: 16.40 sec;   INR: 1.43 ratio         PTT - ( 18 Feb 2021 12:29 )  PTT:29.1 sec    Trop 0.02, CKMB --, CK --, 02-18-21 @ 12:29        RADIOLOGY & ADDITIONAL TESTS:    < from: CT Abdomen and Pelvis w/ IV Cont (02.18.21 @ 17:32) >    IMPRESSION:    1. No evidence of acute/inflammatory process within the abdomen or pelvis.    2. Small to moderate hiatal hernia.    3. Enlarged prostate gland.      < end of copied text >    Imaging or report Personally Reviewed:  [ ] YES  [ ] NO    Medications:  Standing  aspirin  chewable 81 milliGRAM(s) Oral daily  atorvastatin Oral Tab/Cap - Peds 10 milliGRAM(s) Oral daily  chlorhexidine 4% Liquid 1 Application(s) Topical <User Schedule>  dextrose 40% Gel 15 Gram(s) Oral once  dextrose 5%. 1000 milliLiter(s) IV Continuous <Continuous>  dextrose 5%. 1000 milliLiter(s) IV Continuous <Continuous>  dextrose 50% Injectable 25 Gram(s) IV Push once  dextrose 50% Injectable 12.5 Gram(s) IV Push once  dextrose 50% Injectable 25 Gram(s) IV Push once  glucagon  Injectable 1 milliGRAM(s) IntraMuscular once  heparin   Injectable 5000 Unit(s) SubCutaneous every 12 hours  insulin lispro (ADMELOG) corrective regimen sliding scale   SubCutaneous three times a day before meals  multivitamin 1 Tablet(s) Oral daily  sertraline 50 milliGRAM(s) Oral daily  sodium chloride 0.9%. 1000 milliLiter(s) IV Continuous <Continuous>  tamsulosin 0.4 milliGRAM(s) Oral at bedtime  thiamine 100 milliGRAM(s) Oral daily  zinc sulfate 220 milliGRAM(s) Oral daily    PRN Meds      Case discussed with resident    Care discussed with pt/family           IVANA KRUSE  87y Male    CHIEF COMPLAINT:    Patient is a 87y old  Male who presents with a chief complaint of Weakness and hypotension (18 Feb 2021 23:55)      INTERVAL HPI/OVERNIGHT EVENTS:    Patient seen and examined. Poor historian. Feels better. No N/V. No diarrhea. No palpitations. No sob.    ROS: All other systems are negative.    Vital Signs:    T(F): 99.8 (02-19-21 @ 05:07), Max: 99.8 (02-19-21 @ 05:07)  HR: 85 (02-19-21 @ 05:07) (69 - 93)  BP: 112/68 (02-19-21 @ 05:07) (85/49 - 112/68)  RR: 19 (02-19-21 @ 05:07) (16 - 19)  SpO2: 94% (02-19-21 @ 04:05) (94% - 99%)  I&O's Summary    Daily Height in cm: 165.1 (19 Feb 2021 05:07)    Daily   CAPILLARY BLOOD GLUCOSE          PHYSICAL EXAM:    GENERAL:  NAD  SKIN: No rashes or lesions  HENT: Atraumatic Normocephalic. PERRL. Moist membranes.  NECK: Supple, No JVD. No lymphadenopathy.  PULMONARY: CTA B/L. No wheezing. No rales  CVS: Normal S1, S2. Rate and Rhythm are regular. No murmurs.  ABDOMEN/GI: Soft, Nontender, Nondistended; BS present  EXTREMITIES: Peripheral pulses intact. No edema B/L LE.  NEUROLOGIC:  No motor or sensory deficit.  PSYCH: Alert & oriented x 2    Consultant(s) Notes Reviewed:  [x ] YES  [ ] NO  Care Discussed with Consultants/Other Providers [ x] YES  [ ] NO    EKG reviewed  Telemetry reviewed    LABS:                        11.2   9.19  )-----------( 131      ( 18 Feb 2021 12:29 )             32.9     02-18    125<L>  |  85<L>  |  40<H>  ----------------------------<  168<H>  Creatinine Trend: 1.4<--, 2.2<--  3.6   |  23  |  2.2<H>    Ca    8.8      18 Feb 2021 12:29  Mg     2.3     02-18    TPro  6.0  /  Alb  3.6  /  TBili  0.9  /  DBili  x   /  AST  51<H>  /  ALT  36  /  AlkPhos  52  02-18    PT/INR - ( 18 Feb 2021 12:29 )   PT: 16.40 sec;   INR: 1.43 ratio         PTT - ( 18 Feb 2021 12:29 )  PTT:29.1 sec    Trop 0.02, CKMB --, CK --, 02-18-21 @ 12:29        RADIOLOGY & ADDITIONAL TESTS:    < from: CT Abdomen and Pelvis w/ IV Cont (02.18.21 @ 17:32) >    IMPRESSION:    1. No evidence of acute/inflammatory process within the abdomen or pelvis.    2. Small to moderate hiatal hernia.    3. Enlarged prostate gland.      < end of copied text >    Imaging or report Personally Reviewed:  [ ] YES  [ ] NO    Medications:  Standing  aspirin  chewable 81 milliGRAM(s) Oral daily  atorvastatin Oral Tab/Cap - Peds 10 milliGRAM(s) Oral daily  chlorhexidine 4% Liquid 1 Application(s) Topical <User Schedule>  dextrose 40% Gel 15 Gram(s) Oral once  dextrose 5%. 1000 milliLiter(s) IV Continuous <Continuous>  dextrose 5%. 1000 milliLiter(s) IV Continuous <Continuous>  dextrose 50% Injectable 25 Gram(s) IV Push once  dextrose 50% Injectable 12.5 Gram(s) IV Push once  dextrose 50% Injectable 25 Gram(s) IV Push once  glucagon  Injectable 1 milliGRAM(s) IntraMuscular once  heparin   Injectable 5000 Unit(s) SubCutaneous every 12 hours  insulin lispro (ADMELOG) corrective regimen sliding scale   SubCutaneous three times a day before meals  multivitamin 1 Tablet(s) Oral daily  sertraline 50 milliGRAM(s) Oral daily  sodium chloride 0.9%. 1000 milliLiter(s) IV Continuous <Continuous>  tamsulosin 0.4 milliGRAM(s) Oral at bedtime  thiamine 100 milliGRAM(s) Oral daily  zinc sulfate 220 milliGRAM(s) Oral daily    PRN Meds      Case discussed with resident    Care discussed with pt/family

## 2021-02-19 NOTE — CONSULT NOTE ADULT - ASSESSMENT
1] Aortic Stenosis  - Recommend 2-D echo to ascertain LVEF and status of Aortic Stenosis and mitral valve regurgitation    2] Hyperlipidemia  - On statin therapy    3] Type II DM  - Glycemic control    4] CVA  - on Aspirin    5] ROXANNA due to Obstructive Uropathy  - Urology input appreciated  - Continue to monitor creatinine    Plan discussed with House Staff    Kevin Patton MD (covering for Dr. Jose Barclay)  [phone] Office

## 2021-02-19 NOTE — PROGRESS NOTE ADULT - ASSESSMENT
87y Male a/w  failure to thrive, ROXANNA -Urology c/s for difficult downey placement 2/2 BPH, [facility]  service, [facility] # [id number] used to examine patient. Patient reports tenderness to tip of meatus 2/2 multiple attempts downey placement by ED. 1    A) Urinary Retention   Resolving ROXANNA 2.2 >1.4   UTI     Plan:   - No acute  intervention at this time   - 14 fr coude in place draining dark yellow to clear yellow urine, no active bleeding or clots noted. Statlock malpositioned, secured downey with foam tape.   - Continue Downey care   - TOV when medically stable or prior d/c    - I&Os per medical team   - Continue Flomax   - Trend Cr   - Recall prn   - Will d/w attending

## 2021-02-19 NOTE — CONSULT NOTE ADULT - SUBJECTIVE AND OBJECTIVE BOX
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 As per the patient he is here because he felt weak and dizzy for past couple of days but he sounded like a poor historian and data obtained was tangential.  History was taken from daughter daughter Melinda [phone] who stated that patient had melanoma excision from anterior chest wall 1/26 (derm Dr. Chaudhry) and developed dysuria. He completed 7-day course of cipro but then developed vomiting so switched to bactrim. Patient developed weakness and had poor oral intake since, he visited [facility] too and got hydration but felt weak progressively.  He denied any fever, cough, cp, sob, abd pain, diarrhea, dysuria, fall, head trauma.     Vital Signs (24 Hrs):  T(C): 36.4 (02-18-21 @ 16:17), Max: 37.3 (02-18-21 @ 12:00)  HR: 74 (02-18-21 @ 18:30) (69 - 93)  BP: 99/57 (02-18-21 @ 18:30) (85/49 - 99/57)  RR: 16 (02-18-21 @ 18:30) (16 - 18)  SpO2: 97% (02-18-21 @ 18:30) (96% - 99%)    Patient is received vitally stable in ER. Blood work is significant for elevated lactate, hyponatremia 125, elevated Cr BUN 2.2/40 (Unknown baseline), AST 51 and elevated troponin 0.02.  UA showed LE (small), few bacteria <10 and blood. CT abdomen pelvis is unremarkable except for enlarged prostate gland. Urology was consulted for Glasgow placement in ER. Patient is being admitted for ROXANNA of post renal cause.       	 (18 Feb 2021 19:16)      PAST MEDICAL & SURGICAL HISTORY:  Heart murmur    High cholesterol    Depression    Melanoma    Diabetes    HTN (hypertension)    BPH (benign prostatic hyperplasia)    CVA (cerebral vascular accident)    History of melanoma excision            SOCIAL HISTORY:     FAMILY HISTORY:  No pertinent family history in first degree relatives      No Known Allergies      MEDICATIONS  (STANDING):  aspirin  chewable 81 milliGRAM(s) Oral daily  atorvastatin Oral Tab/Cap - Peds 10 milliGRAM(s) Oral daily  chlorhexidine 4% Liquid 1 Application(s) Topical <User Schedule>  dextrose 40% Gel 15 Gram(s) Oral once  dextrose 5%. 1000 milliLiter(s) (50 mL/Hr) IV Continuous <Continuous>  dextrose 5%. 1000 milliLiter(s) (100 mL/Hr) IV Continuous <Continuous>  dextrose 50% Injectable 25 Gram(s) IV Push once  dextrose 50% Injectable 12.5 Gram(s) IV Push once  dextrose 50% Injectable 25 Gram(s) IV Push once  glucagon  Injectable 1 milliGRAM(s) IntraMuscular once  heparin   Injectable 5000 Unit(s) SubCutaneous every 12 hours  insulin lispro (ADMELOG) corrective regimen sliding scale   SubCutaneous three times a day before meals  multivitamin 1 Tablet(s) Oral daily  sertraline 50 milliGRAM(s) Oral daily  sodium chloride 0.9%. 1000 milliLiter(s) (75 mL/Hr) IV Continuous <Continuous>  tamsulosin 0.4 milliGRAM(s) Oral at bedtime  thiamine 100 milliGRAM(s) Oral daily  zinc sulfate 220 milliGRAM(s) Oral daily    MEDICATIONS  (PRN):  haloperidol    Injectable 1 milliGRAM(s) IntraMuscular once PRN Agitation      Vital Signs Last 24 Hrs  T(C): 37.1 (19 Feb 2021 20:37), Max: 37.7 (19 Feb 2021 05:07)  T(F): 98.7 (19 Feb 2021 20:37), Max: 99.8 (19 Feb 2021 05:07)  HR: 100 (19 Feb 2021 20:37) (80 - 100)  BP: 118/63 (19 Feb 2021 20:37) (108/58 - 118/63)  BP(mean): --  RR: 18 (19 Feb 2021 20:37) (18 - 19)  SpO2: 94% (19 Feb 2021 20:11) (94% - 94%) I&O's Detail    19 Feb 2021 07:01  -  19 Feb 2021 22:56  --------------------------------------------------------  IN:  Total IN: 0 mL    OUT:    Incontinent per Collection Bag (mL): 1100 mL  Total OUT: 1100 mL    Total NET: -1100 mL        PHYSICAL EXAM:    REVIEW OF SYSTEMS            ECG:  ECHOCARDIOGRAM:  RADIOLOGY & ADDITIONAL STUDIES:      LABS:                        10.0   8.48  )-----------( 116      ( 19 Feb 2021 04:30 )             30.4     02-19    131<L>  |  94<L>  |  36<H>  ----------------------------<  101<H>  3.7   |  25  |  1.4    Ca    8.0<L>      19 Feb 2021 04:30  Mg     2.3     02-18    TPro  5.0<L>  /  Alb  3.0<L>  /  TBili  0.8  /  DBili  x   /  AST  34  /  ALT  31  /  AlkPhos  43  02-19    CARDIAC MARKERS ( 19 Feb 2021 04:30 )  x     / 0.01 ng/mL / x     / x     / x      CARDIAC MARKERS ( 18 Feb 2021 12:29 )  x     / 0.02 ng/mL / x     / x     / x          PT/INR - ( 18 Feb 2021 12:29 )   PT: 16.40 sec;   INR: 1.43 ratio         PTT - ( 18 Feb 2021 12:29 )  PTT:29.1 sec  I&O's Summary    19 Feb 2021 07:01  -  19 Feb 2021 22:56  --------------------------------------------------------  IN: 0 mL / OUT: 1100 mL / NET: -1100 mL       Patient was seen and examined by me on 3C.  EMR was reviewed.      Patient is a 87y old  Male who presents with a chief complaint of UTI (19 Feb 2021 12:10)      REASON FOR CONSULT: Aortic Stenosis    HPI:  History obtained from chart  Mr. Ken Mora is an 87-year-old Wol[place] speaking male with a past medical history of Aortic Stenosis, CVA 2015 with residual dementia, Hypertension, Type II DM, Hyperlipidemia, BPH, Depression and Melanoma S/P Excision.    He presented at [facility] because of generalized weakness of 2 days duration.  He developed urinary symptoms after he had surgical excision of his melanoma on Jan 26, 2021.  He was treated with po antibiotic but he developed weakness and poor po intake that prompted family to take him to the hospital.  His son-inl-law, Dr. Campos request cardiology evaluation for his aortic stenosis during this admission.  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 As per the patient he is here because he felt weak and dizzy for past couple of days but he sounded like a poor historian and data obtained was tangential.  History was taken from daughter daughter Melinda [phone] who stated that patient had melanoma excision from anterior chest wall 1/26 (derm Dr. Chaudhry) and developed dysuria. He completed 7-day course of cipro but then developed vomiting so switched to bactrim. Patient developed weakness and had poor oral intake since, he [place] [facility] [place] and got hydration but felt weak progressively.  He denied any fever, cough, cp, sob, abd pain, diarrhea, dysuria, fall, head trauma.     Vital Signs (24 Hrs):  T(C): 36.4 (02-18-21 @ 16:17), Max: 37.3 (02-18-21 @ 12:00)  HR: 74 (02-18-21 @ 18:30) (69 - 93)  BP: 99/57 (02-18-21 @ 18:30) (85/49 - 99/57)  RR: 16 (02-18-21 @ 18:30) (16 - 18)  SpO2: 97% (02-18-21 @ 18:30) (96% - 99%)    Patient is received vitally stable in ER. Blood work is significant for elevated lactate, hyponatremia 125, elevated Cr BUN 2.2/40 (Unknown baseline), AST 51 and elevated troponin 0.02.  UA showed LE (small), few bacteria <10 and blood. CT abdomen pelvis is unremarkable except for enlarged prostate gland. Urology was consulted for Glasgow placement in ER. Patient is being admitted for ROXANNA of post renal cause.       	 (18 Feb 2021 19:16)      PAST MEDICAL & SURGICAL HISTORY:  Heart murmur (Aortic Stenosis)    High cholesterol    Depression    Melanoma    Diabetes    HTN (hypertension)    BPH (benign prostatic hyperplasia)    CVA (cerebral vascular accident)    History of melanoma excision            SOCIAL HISTORY:     FAMILY HISTORY:  No pertinent family history in first degree relatives      No Known Allergies      MEDICATIONS  (STANDING):  aspirin  chewable 81 milliGRAM(s) Oral daily  atorvastatin Oral Tab/Cap - Peds 10 milliGRAM(s) Oral daily  chlorhexidine 4% Liquid 1 Application(s) Topical <User Schedule>  dextrose 40% Gel 15 Gram(s) Oral once  dextrose 5%. 1000 milliLiter(s) (50 mL/Hr) IV Continuous <Continuous>  dextrose 5%. 1000 milliLiter(s) (100 mL/Hr) IV Continuous <Continuous>  dextrose 50% Injectable 25 Gram(s) IV Push once  dextrose 50% Injectable 12.5 Gram(s) IV Push once  dextrose 50% Injectable 25 Gram(s) IV Push once  glucagon  Injectable 1 milliGRAM(s) IntraMuscular once  heparin   Injectable 5000 Unit(s) SubCutaneous every 12 hours  insulin lispro (ADMELOG) corrective regimen sliding scale   SubCutaneous three times a day before meals  multivitamin 1 Tablet(s) Oral daily  sertraline 50 milliGRAM(s) Oral daily  sodium chloride 0.9%. 1000 milliLiter(s) (75 mL/Hr) IV Continuous <Continuous>  tamsulosin 0.4 milliGRAM(s) Oral at bedtime  thiamine 100 milliGRAM(s) Oral daily  zinc sulfate 220 milliGRAM(s) Oral daily    MEDICATIONS  (PRN):  haloperidol    Injectable 1 milliGRAM(s) IntraMuscular once PRN Agitation      Vital Signs Last 24 Hrs  T(C): 37.1 (19 Feb 2021 20:37), Max: 37.7 (19 Feb 2021 05:07)  T(F): 98.7 (19 Feb 2021 20:37), Max: 99.8 (19 Feb 2021 05:07)  HR: 100 (19 Feb 2021 20:37) (80 - 100)  BP: 118/63 (19 Feb 2021 20:37) (108/58 - 118/63)  BP(mean): --  RR: 18 (19 Feb 2021 20:37) (18 - 19)  SpO2: 94% (19 Feb 2021 20:11) (94% - 94%) I&O's Detail    19 Feb 2021 07:01  -  19 Feb 2021 22:56  --------------------------------------------------------  IN:  Total IN: 0 mL    OUT:    Incontinent per Collection Bag (mL): 1100 mL  Total OUT: 1100 mL    Total NET: -1100 mL        PHYSICAL EXAM:  Not in apparent distress  Normocephalic; EOMs intact  Alert, following commands  Dressing on upper anterior chest in place  No JVD; regular rhythm; nl S1 soft S2 YUNIOR III/VI base  Bilateral breath sounds  Abdomen soft  No edema   Glasgow catheter in place    REVIEW OF SYSTEMS: Negative except as stated in HPI      ECG: Sinus Rhythm    ECHOCARDIOGRAM:  No echo on file [facility]  Last echo in office in Dec 2018:  Normal LVEF  JEFFY 0.9 cm squared.  Mild eccentric Mitral Regurgitation.  Grade 1 Diastolic Dysfunction.    RADIOLOGY & ADDITIONAL STUDIES:  < from: Xray Chest 1 View- PORTABLE-Urgent (02.18.21 @ 14:00) >  Findings:    Support devices: Telemetry leads overlie the thorax.    Cardiac/mediastinum/hilum: Aorta is atherosclerotic.    Lung parenchyma/Pleura: Normal.    Skeleton/soft tissues: Unremarkable.    Impression:    No radiographic evidence of acute cardiopulmonary disease.      < end of copied text >          LABS:                        10.0   8.48  )-----------( 116      ( 19 Feb 2021 04:30 )             30.4     02-19    131<L>  |  94<L>  |  36<H>  ----------------------------<  101<H>  3.7   |  25  |  1.4    Ca    8.0<L>      19 Feb 2021 04:30  Mg     2.3     02-18    TPro  5.0<L>  /  Alb  3.0<L>  /  TBili  0.8  /  DBili  x   /  AST  34  /  ALT  31  /  AlkPhos  43  02-19    CARDIAC MARKERS ( 19 Feb 2021 04:30 )  x     / 0.01 ng/mL / x     / x     / x      CARDIAC MARKERS ( 18 Feb 2021 12:29 )  x     / 0.02 ng/mL / x     / x     / x          PT/INR - ( 18 Feb 2021 12:29 )   PT: 16.40 sec;   INR: 1.43 ratio         PTT - ( 18 Feb 2021 12:29 )  PTT:29.1 sec  I&O's Summary    19 Feb 2021 07:01  -  19 Feb 2021 22:56  --------------------------------------------------------  IN: 0 mL / OUT: 1100 mL / NET: -1100 mL

## 2021-02-19 NOTE — PHYSICAL THERAPY INITIAL EVALUATION ADULT - GAIT TRAINING, PT EVAL
Quality 111:Pneumonia Vaccination Status For Older Adults: Pneumococcal Vaccination not Administered or Previously Received, Reason not Otherwise Specified Detail Level: Detailed Quality 110: Preventive Care And Screening: Influenza Immunization: Influenza Immunization previously received during influenza season 150 ft IRVING/St. cane supervisios by d/c & 8 steps with 1HR Supervision by d/c

## 2021-02-20 ENCOUNTER — TRANSCRIPTION ENCOUNTER (OUTPATIENT)
Age: 86
End: 2021-02-20

## 2021-02-20 VITALS
TEMPERATURE: 97 F | HEART RATE: 85 BPM | DIASTOLIC BLOOD PRESSURE: 65 MMHG | RESPIRATION RATE: 18 BRPM | SYSTOLIC BLOOD PRESSURE: 118 MMHG

## 2021-02-20 LAB
A1C WITH ESTIMATED AVERAGE GLUCOSE RESULT: 6.7 % — HIGH (ref 4–5.6)
ALBUMIN SERPL ELPH-MCNC: 3.2 G/DL — LOW (ref 3.5–5.2)
ALP SERPL-CCNC: 60 U/L — SIGNIFICANT CHANGE UP (ref 30–115)
ALT FLD-CCNC: 40 U/L — SIGNIFICANT CHANGE UP (ref 0–41)
ANION GAP SERPL CALC-SCNC: 13 MMOL/L — SIGNIFICANT CHANGE UP (ref 7–14)
AST SERPL-CCNC: 38 U/L — SIGNIFICANT CHANGE UP (ref 0–41)
BASOPHILS # BLD AUTO: 0.04 K/UL — SIGNIFICANT CHANGE UP (ref 0–0.2)
BASOPHILS NFR BLD AUTO: 0.5 % — SIGNIFICANT CHANGE UP (ref 0–1)
BILIRUB SERPL-MCNC: 1.1 MG/DL — SIGNIFICANT CHANGE UP (ref 0.2–1.2)
BUN SERPL-MCNC: 37 MG/DL — HIGH (ref 10–20)
CALCIUM SERPL-MCNC: 8.2 MG/DL — LOW (ref 8.5–10.1)
CHLORIDE SERPL-SCNC: 99 MMOL/L — SIGNIFICANT CHANGE UP (ref 98–110)
CO2 SERPL-SCNC: 23 MMOL/L — SIGNIFICANT CHANGE UP (ref 17–32)
CREAT SERPL-MCNC: 1.2 MG/DL — SIGNIFICANT CHANGE UP (ref 0.7–1.5)
EOSINOPHIL # BLD AUTO: 0.02 K/UL — SIGNIFICANT CHANGE UP (ref 0–0.7)
EOSINOPHIL NFR BLD AUTO: 0.2 % — SIGNIFICANT CHANGE UP (ref 0–8)
ESTIMATED AVERAGE GLUCOSE: 146 MG/DL — HIGH (ref 68–114)
FOLATE SERPL-MCNC: 10.7 NG/ML — SIGNIFICANT CHANGE UP
GLUCOSE BLDC GLUCOMTR-MCNC: 150 MG/DL — HIGH (ref 70–99)
GLUCOSE BLDC GLUCOMTR-MCNC: 184 MG/DL — HIGH (ref 70–99)
GLUCOSE SERPL-MCNC: 129 MG/DL — HIGH (ref 70–99)
HCT VFR BLD CALC: 32.5 % — LOW (ref 42–52)
HGB BLD-MCNC: 10.5 G/DL — LOW (ref 14–18)
IMM GRANULOCYTES NFR BLD AUTO: 0.6 % — HIGH (ref 0.1–0.3)
LYMPHOCYTES # BLD AUTO: 1.3 K/UL — SIGNIFICANT CHANGE UP (ref 1.2–3.4)
LYMPHOCYTES # BLD AUTO: 16.1 % — LOW (ref 20.5–51.1)
MCHC RBC-ENTMCNC: 19.5 PG — LOW (ref 27–31)
MCHC RBC-ENTMCNC: 32.3 G/DL — SIGNIFICANT CHANGE UP (ref 32–37)
MCV RBC AUTO: 60.4 FL — LOW (ref 80–94)
MONOCYTES # BLD AUTO: 0.77 K/UL — HIGH (ref 0.1–0.6)
MONOCYTES NFR BLD AUTO: 9.5 % — HIGH (ref 1.7–9.3)
NEUTROPHILS # BLD AUTO: 5.89 K/UL — SIGNIFICANT CHANGE UP (ref 1.4–6.5)
NEUTROPHILS NFR BLD AUTO: 73.1 % — SIGNIFICANT CHANGE UP (ref 42.2–75.2)
NRBC # BLD: 0 /100 WBCS — SIGNIFICANT CHANGE UP (ref 0–0)
PLATELET # BLD AUTO: 121 K/UL — LOW (ref 130–400)
POTASSIUM SERPL-MCNC: 3.6 MMOL/L — SIGNIFICANT CHANGE UP (ref 3.5–5)
POTASSIUM SERPL-SCNC: 3.6 MMOL/L — SIGNIFICANT CHANGE UP (ref 3.5–5)
PROT SERPL-MCNC: 5.2 G/DL — LOW (ref 6–8)
RBC # BLD: 5.38 M/UL — SIGNIFICANT CHANGE UP (ref 4.7–6.1)
RBC # FLD: 15.2 % — HIGH (ref 11.5–14.5)
SODIUM SERPL-SCNC: 135 MMOL/L — SIGNIFICANT CHANGE UP (ref 135–146)
VIT B12 SERPL-MCNC: 219 PG/ML — LOW (ref 232–1245)
WBC # BLD: 8.07 K/UL — SIGNIFICANT CHANGE UP (ref 4.8–10.8)
WBC # FLD AUTO: 8.07 K/UL — SIGNIFICANT CHANGE UP (ref 4.8–10.8)

## 2021-02-20 PROCEDURE — 99233 SBSQ HOSP IP/OBS HIGH 50: CPT

## 2021-02-20 PROCEDURE — 93306 TTE W/DOPPLER COMPLETE: CPT | Mod: 26

## 2021-02-20 RX ADMIN — HEPARIN SODIUM 5000 UNIT(S): 5000 INJECTION INTRAVENOUS; SUBCUTANEOUS at 05:14

## 2021-02-20 RX ADMIN — ZINC SULFATE TAB 220 MG (50 MG ZINC EQUIVALENT) 220 MILLIGRAM(S): 220 (50 ZN) TAB at 12:37

## 2021-02-20 RX ADMIN — SERTRALINE 50 MILLIGRAM(S): 25 TABLET, FILM COATED ORAL at 12:36

## 2021-02-20 RX ADMIN — CHLORHEXIDINE GLUCONATE 1 APPLICATION(S): 213 SOLUTION TOPICAL at 05:14

## 2021-02-20 RX ADMIN — Medication 81 MILLIGRAM(S): at 12:36

## 2021-02-20 RX ADMIN — Medication 1: at 12:31

## 2021-02-20 RX ADMIN — Medication 1 TABLET(S): at 12:37

## 2021-02-20 RX ADMIN — Medication 100 MILLIGRAM(S): at 12:36

## 2021-02-20 RX ADMIN — ATORVASTATIN CALCIUM 10 MILLIGRAM(S): 80 TABLET, FILM COATED ORAL at 12:36

## 2021-02-20 NOTE — PROGRESS NOTE ADULT - SUBJECTIVE AND OBJECTIVE BOX
IVANA KRUSE 87y Male  MRN#: [id number]   CODE STATUS:________      SUBJECTIVE  Patient is a 87y old Male who presents with a chief complaint of UTI (2021 22:54)  Currently admitted to medicine with the primary diagnosis of ROXANNA (acute kidney injury)      Today is hospital day 2d.  No acute events overnight. Patient underwent went TOV yesterday and Glasgow removed.          OBJECTIVE  PAST MEDICAL & SURGICAL HISTORY  Heart murmur    High cholesterol    Depression    Melanoma    Diabetes    HTN (hypertension)    BPH (benign prostatic hyperplasia)    CVA (cerebral vascular accident)    History of melanoma excision      ALLERGIES:  No Known Allergies    MEDICATIONS:  STANDING MEDICATIONS  aspirin  chewable 81 milliGRAM(s) Oral daily  atorvastatin Oral Tab/Cap - Peds 10 milliGRAM(s) Oral daily  chlorhexidine 4% Liquid 1 Application(s) Topical <User Schedule>  dextrose 40% Gel 15 Gram(s) Oral once  dextrose 5%. 1000 milliLiter(s) IV Continuous <Continuous>  dextrose 5%. 1000 milliLiter(s) IV Continuous <Continuous>  dextrose 50% Injectable 25 Gram(s) IV Push once  dextrose 50% Injectable 12.5 Gram(s) IV Push once  dextrose 50% Injectable 25 Gram(s) IV Push once  glucagon  Injectable 1 milliGRAM(s) IntraMuscular once  heparin   Injectable 5000 Unit(s) SubCutaneous every 12 hours  insulin lispro (ADMELOG) corrective regimen sliding scale   SubCutaneous three times a day before meals  multivitamin 1 Tablet(s) Oral daily  sertraline 50 milliGRAM(s) Oral daily  sodium chloride 0.9%. 1000 milliLiter(s) IV Continuous <Continuous>  tamsulosin 0.4 milliGRAM(s) Oral at bedtime  thiamine 100 milliGRAM(s) Oral daily  zinc sulfate 220 milliGRAM(s) Oral daily    PRN MEDICATIONS      VITAL SIGNS: Last 24 Hours  T(C): 36.2 (2021 05:41), Max: 37.1 (2021 20:37)  T(F): 97.1 (2021 05:41), Max: 98.7 (2021 20:37)  HR: 92 (2021 05:41) (80 - 100)  BP: 117/65 (2021 05:41) (114/55 - 118/63)  BP(mean): --  RR: 18 (2021 05:41) (18 - 18)  SpO2: 94% (2021 20:11) (94% - 94%)    LABS:                        10.5   8.07  )-----------( 121      ( 2021 05:40 )             32.5     02-20    135  |  99  |  37<H>  ----------------------------<  129<H>  3.6   |  23  |  1.2    Ca    8.2<L>      2021 05:40  Mg     2.3     02-18    TPro  5.2<L>  /  Alb  3.2<L>  /  TBili  1.1  /  DBili  x   /  AST  38  /  ALT  40  /  AlkPhos  60  02-20    PT/INR - ( 2021 12:29 )   PT: 16.40 sec;   INR: 1.43 ratio         PTT - ( 2021 12:29 )  PTT:29.1 sec  Urinalysis Basic - ( 2021 11:36 )    Color: Light Orange / Appearance: Slightly Turbid / S.024 / pH: x  Gluc: x / Ketone: Negative  / Bili: Negative / Urobili: <2 mg/dL   Blood: x / Protein: 30 mg/dL / Nitrite: Negative   Leuk Esterase: Small / RBC: 113 /HPF / WBC 8 /HPF   Sq Epi: x / Non Sq Epi: 1 /HPF / Bacteria: Negative            Culture - Blood (collected 2021 12:29)  Source: .Blood Blood-Peripheral  Preliminary Report (2021 23:02):    No growth to date.    Culture - Blood (collected 2021 12:29)  Source: .Blood Blood-Peripheral  Preliminary Report (2021 23:02):    No growth to date.    Culture - Urine (collected 2021 11:36)  Source: .Urine Clean Catch (Midstream)  Final Report (2021 21:35):    No growth      CARDIAC MARKERS ( 2021 04:30 )  x     / 0.01 ng/mL / x     / x     / x      CARDIAC MARKERS ( 2021 12:29 )  x     / 0.02 ng/mL / x     / x     / x          RADIOLOGY:  < from: CT Abdomen and Pelvis w/ IV Cont (21 @ 17:32) >  IMPRESSION:    1. No evidence of acute/inflammatory process within the abdomen or pelvis.    2. Small to moderate hiatal hernia.    3. Enlarged prostate gland.    < end of copied text >      PHYSICAL EXAM:    GENERAL: NAD, well-developed, AAOx3  HEENT:  Atraumatic, Normocephalic. EOMI, PERRLA, conjunctiva and sclera clear, No JVD  PULMONARY: Clear to auscultation bilaterally; No wheeze  CARDIOVASCULAR: Regular rate and rhythm; diffuse murmurs, rubs, or gallops  GASTROINTESTINAL: Soft, Nontender, Nondistended; Bowel sounds present  MUSCULOSKELETAL:  2+ Peripheral Pulses, No clubbing, cyanosis, or edema  NEUROLOGY: non-focal  SKIN: No rashes or lesions

## 2021-02-20 NOTE — DISCHARGE NOTE PROVIDER - NSDCMRMEDTOKEN_GEN_ALL_CORE_FT
aspirin 81 mg oral tablet, chewable: 1 tab(s) orally once a day  atorvastatin 10 mg oral tablet: 1 tab(s) orally once a day  Calcium 600+D Plus Minerals oral tablet, chewable: 1 tab(s) orally once a day  folic acid 1 mg oral tablet: 1 tab(s) orally once a day  hydroCHLOROthiazide 12.5 mg oral capsule: 1 cap(s) orally once a day  metFORMIN 500 mg oral tablet, extended release: 1 tab(s) orally once a day  NIFEdipine 60 mg oral tablet, extended release: 1 tab(s) orally once a day  ramipril 10 mg oral tablet: 10 milligram(s) orally 2 times a day  sertraline 50 mg oral tablet: 1 tab(s) orally once a day  tamsulosin 0.4 mg oral capsule: 1 cap(s) orally once a day  Vitamin C 25 mg oral tablet, chewable: 1 tab(s) orally once a day  Vitamin D3 50,000 intl units (1250 mcg) oral capsule:   Zinc 140 mg (as elemental zinc 50 mg) oral tablet: 1 tab(s) orally once a day   aspirin 81 mg oral tablet, chewable: 1 tab(s) orally once a day  atorvastatin 10 mg oral tablet: 1 tab(s) orally once a day  Calcium 600+D Plus Minerals oral tablet, chewable: 1 tab(s) orally once a day  folic acid 1 mg oral tablet: 1 tab(s) orally once a day  hydroCHLOROthiazide 12.5 mg oral tablet: 1 tab(s) orally once a day  metFORMIN 500 mg oral tablet, extended release: 1 tab(s) orally once a day  NIFEdipine 60 mg oral tablet, extended release: 1 tab(s) orally once a day  ramipril 10 mg oral tablet: 10 milligram(s) orally 2 times a day  sertraline 50 mg oral tablet: 1 tab(s) orally once a day  tamsulosin 0.4 mg oral capsule: 1 cap(s) orally once a day  Vitamin C 25 mg oral tablet, chewable: 1 tab(s) orally once a day  Vitamin D3 50,000 intl units (1250 mcg) oral capsule:   Zinc 140 mg (as elemental zinc 50 mg) oral tablet: 1 tab(s) orally once a day

## 2021-02-20 NOTE — DISCHARGE NOTE PROVIDER - NSDCCPCAREPLAN_GEN_ALL_CORE_FT
PRINCIPAL DISCHARGE DIAGNOSIS  Diagnosis: ROXANNA (acute kidney injury)  Assessment and Plan of Treatment: You presented with acute kidney injury secondary to dehydrationa and poor oral intake. We also saw that you were retaining urine due to the enlarged prostate and we place a downey catheter as a result. You improved with urination and adequate oral intake plus fluids.   We recommed that you follow up o/p with your urologist for further evaluation  Continue home medications      SECONDARY DISCHARGE DIAGNOSES  Diagnosis: Enlarged prostate  Assessment and Plan of Treatment: Follow up with urologist and continue to take home medications    Diagnosis: Murmur  Assessment and Plan of Treatment: Please follow up with your primary care physician for an echo cardiogram work up to evaluate for a heart murmur heard on examination

## 2021-02-20 NOTE — DISCHARGE NOTE PROVIDER - HOSPITAL COURSE
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 As per the patient he is here because he felt weak and dizzy for past couple of days but he sounded like a poor historian and data obtained was tangential.  History was taken from daughter daughter Melinda [phone] who stated that patient had melanoma excision from anterior chest wall 1/26 (derm Dr. Chaudhry) and developed dysuria. He completed 7-day course of cipro but then developed vomiting so switched to bactrim. Patient developed weakness and had poor oral intake since, he visited [facility] too and got hydration but felt weak progressively.  He denied any fever, cough, cp, sob, abd pain, diarrhea, dysuria, fall, head trauma.     Vital Signs (24 Hrs):  T(C): 36.4 (02-18-21 @ 16:17), Max: 37.3 (02-18-21 @ 12:00)  HR: 74 (02-18-21 @ 18:30) (69 - 93)  BP: 99/57 (02-18-21 @ 18:30) (85/49 - 99/57)  RR: 16 (02-18-21 @ 18:30) (16 - 18)  SpO2: 97% (02-18-21 @ 18:30) (96% - 99%)    Patient is received vitally stable in ER. Blood work is significant for elevated lactate, hyponatremia 125, elevated Cr BUN 2.2/40 (Unknown baseline), AST 51 and elevated troponin 0.02.  UA showed LE (small), few bacteria <10 and blood. CT abdomen pelvis is unremarkable except for enlarged prostate gland. Urology was consulted for Glasgow placement in ER. Patient is being admitted for ROXANNA of post renal cause.

## 2021-02-20 NOTE — PROGRESS NOTE ADULT - ASSESSMENT
86 yo M pt w/ multiple medical ailments being treated on an out-pt basis for a UTI now coming with a failure to thrive picture (poor PO intake, mild neuro-cognitive impairment, functional decline w/ an inability to ambulate due to generalized weakness). Hemodynamically stable and afebrile. Needs PT.    (1) Failure to thrive (poor PO intake, mild neuro-cognitive impairment, functional decline):  --- Supportive care: optimize nutritional status - encourage PO intake  --- PT eval completed    (2) Stage 2 acute renal failure (serum Cr 1 in 2018) - suspect pre-renal etiology 2/2 poor PO:  --- improving 2.2 to 1.2    (3) Urinary retention 2/2 BPH (s/p downey catheterization in the ED w/ 400 mL subsequent output):  --- D/C downey catheter   --- C/w tamsulosin  --- out-pt Urology f/u G    (4) S/p out-pt treatment for UTI w/ 7 day course of cipro & 11 days of TMP-SMX:  --- Monitor off abx (pt has been adequately treated for a UTI)    (5) DM w/ hyperglycemia:  --- controlled    (6) HTN - chronic (now relatively hypotensive) / dyslipidemia - chronic:  --- C/w statin as dosed  --- blood pressure has been in the 115/85 area, will continue to hold bp meds.  --- Will advice patient to f/u PMD in less than a week for medication resumption, monitor bp.     (7) Microcytic anemia possible thalassemia trait (Mentzer index < 13):  --- Stable H&H (monitor periodically - no c/o bleeding)    (8) Hyponatremia w/ dehydration:  --- Na 135, improved    (9) Elevated anion gap acidosis w/ concurrent met alkalosis 2/2 renal insufficiency:  --- Clinically stable  --- Monitor renal function as above    (10) Mildly elevated lactic acid level likely 2/2 dehydration and poor PO intake:  --- S/p fluid resuscitation; encourage PO intake    (11) Hx of CVA in 2015 w/ residual deficits - chronic:  --- ASA, statin  --- Supportive care    (12) Depression - stable mood:  --- C/w sertraline    13 Thiamine deficiency  --- c/w thiamine    (14) Miscellaneous:  --- Carb consistent / DASH-TLC diet  --- DVT ppx w/ heparin  --- GI ppx is not indicated      Full code

## 2021-02-20 NOTE — DISCHARGE NOTE PROVIDER - CARE PROVIDER_API CALL
TORI KINNEY  [id number] [address] [place] [address]  Phone: [phone]  Fax: [phone])-  Follow Up Time: 2 weeks   TORI KINNEY  [id number]  [address] [place] [address]  Phone: ()-  Fax: ()-  Follow Up Time: 2 weeks    Scott Esparza  UROLOGY  [address] [place] [address] [address]  [place] [address]  Phone: [phone]  Fax: [phone]  Follow Up Time:

## 2021-02-20 NOTE — DISCHARGE NOTE PROVIDER - PROVIDER TOKENS
PROVIDER:[TOKEN:[[id number]],FOLLOWUP:[2 weeks]] PROVIDER:[TOKEN:[[id number]],FOLLOWUP:[2 weeks]],PROVIDER:[TOKEN:[[id number]]]

## 2021-02-20 NOTE — PROGRESS NOTE ADULT - ATTENDING COMMENTS
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    Patient was seen and examined this am   no acute overnight events noted   comfortably lying in bed     1. ROXANNA -likely post renal from urinary retention   2. Dehydration 2/2 poor po intake   3. Hyponatremia - resolved   4. DM-2   5. HTN   6. Mixed HDL  7. H/O CVA  8. BPH    PLAN:    - Urinary retention on admission. S/P Glasgow's. 400 cc urine came out   - passed TOV -->  Out pt F/U with Dr. Prajapati  - Hypovolumic hyponatremia - resolved   Monitor electrolytes and renal function  Monitor FS. Cont Insulin  CT abd/pelvis showed enlarged prostate.

## 2021-02-20 NOTE — DISCHARGE NOTE NURSING/CASE MANAGEMENT/SOCIAL WORK - PATIENT PORTAL LINK FT
You can access the FollowMyHealth Patient Portal offered by [facility] by registering at the following website: [email or web address]. By joining [facility]’s FollowMyHealth portal, you will also be able to view your health information using other applications (apps) compatible with our system.

## 2021-02-23 ENCOUNTER — APPOINTMENT (OUTPATIENT)
Dept: UROLOGY | Facility: CLINIC | Age: 86
End: 2021-02-23
Payer: MEDICARE

## 2021-02-23 VITALS — TEMPERATURE: 96.4 F

## 2021-02-23 PROBLEM — I10 ESSENTIAL (PRIMARY) HYPERTENSION: Chronic | Status: ACTIVE | Noted: 2021-02-18

## 2021-02-23 PROBLEM — E11.9 TYPE 2 DIABETES MELLITUS WITHOUT COMPLICATIONS: Chronic | Status: ACTIVE | Noted: 2021-02-18

## 2021-02-23 PROBLEM — R01.1 CARDIAC MURMUR, UNSPECIFIED: Chronic | Status: ACTIVE | Noted: 2021-02-18

## 2021-02-23 PROBLEM — E78.00 PURE HYPERCHOLESTEROLEMIA, UNSPECIFIED: Chronic | Status: ACTIVE | Noted: 2021-02-18

## 2021-02-23 PROBLEM — N40.0 BENIGN PROSTATIC HYPERPLASIA WITHOUT LOWER URINARY TRACT SYMPTOMS: Chronic | Status: ACTIVE | Noted: 2021-02-18

## 2021-02-23 PROBLEM — I63.9 CEREBRAL INFARCTION, UNSPECIFIED: Chronic | Status: ACTIVE | Noted: 2021-02-18

## 2021-02-23 PROBLEM — C43.9 MALIGNANT MELANOMA OF SKIN, UNSPECIFIED: Chronic | Status: ACTIVE | Noted: 2021-02-18

## 2021-02-23 PROBLEM — F32.9 MAJOR DEPRESSIVE DISORDER, SINGLE EPISODE, UNSPECIFIED: Chronic | Status: ACTIVE | Noted: 2021-02-18

## 2021-02-23 LAB
CULTURE RESULTS: SIGNIFICANT CHANGE UP
CULTURE RESULTS: SIGNIFICANT CHANGE UP
SPECIMEN SOURCE: SIGNIFICANT CHANGE UP
SPECIMEN SOURCE: SIGNIFICANT CHANGE UP

## 2021-02-23 PROCEDURE — 51798 US URINE CAPACITY MEASURE: CPT

## 2021-02-23 PROCEDURE — 99213 OFFICE O/P EST LOW 20 MIN: CPT

## 2021-02-23 RX ORDER — TERAZOSIN HCL 5 MG
5 TABLET ORAL
Refills: 0 | Status: DISCONTINUED | COMMUNITY
End: 2021-02-23

## 2021-02-23 RX ORDER — TAMSULOSIN HYDROCHLORIDE 0.4 MG/1
0.4 CAPSULE ORAL
Refills: 0 | Status: ACTIVE | COMMUNITY

## 2021-02-25 DIAGNOSIS — R53.1 WEAKNESS: ICD-10-CM

## 2021-02-25 DIAGNOSIS — E87.2 ACIDOSIS: ICD-10-CM

## 2021-02-25 DIAGNOSIS — E51.9 THIAMINE DEFICIENCY, UNSPECIFIED: ICD-10-CM

## 2021-02-25 DIAGNOSIS — E87.1 HYPO-OSMOLALITY AND HYPONATREMIA: ICD-10-CM

## 2021-02-25 DIAGNOSIS — D64.9 ANEMIA, UNSPECIFIED: ICD-10-CM

## 2021-02-25 DIAGNOSIS — Z85.820 PERSONAL HISTORY OF MALIGNANT MELANOMA OF SKIN: ICD-10-CM

## 2021-02-25 DIAGNOSIS — N17.9 ACUTE KIDNEY FAILURE, UNSPECIFIED: ICD-10-CM

## 2021-02-25 DIAGNOSIS — E87.3 ALKALOSIS: ICD-10-CM

## 2021-02-25 DIAGNOSIS — F32.9 MAJOR DEPRESSIVE DISORDER, SINGLE EPISODE, UNSPECIFIED: ICD-10-CM

## 2021-02-25 DIAGNOSIS — F03.90 UNSPECIFIED DEMENTIA, UNSPECIFIED SEVERITY, WITHOUT BEHAVIORAL DISTURBANCE, PSYCHOTIC DISTURBANCE, MOOD DISTURBANCE, AND ANXIETY: ICD-10-CM

## 2021-02-25 DIAGNOSIS — R62.7 ADULT FAILURE TO THRIVE: ICD-10-CM

## 2021-02-25 DIAGNOSIS — I35.0 NONRHEUMATIC AORTIC (VALVE) STENOSIS: ICD-10-CM

## 2021-02-25 DIAGNOSIS — R01.1 CARDIAC MURMUR, UNSPECIFIED: ICD-10-CM

## 2021-02-25 DIAGNOSIS — N40.1 BENIGN PROSTATIC HYPERPLASIA WITH LOWER URINARY TRACT SYMPTOMS: ICD-10-CM

## 2021-02-25 DIAGNOSIS — E86.0 DEHYDRATION: ICD-10-CM

## 2021-02-25 DIAGNOSIS — E78.00 PURE HYPERCHOLESTEROLEMIA, UNSPECIFIED: ICD-10-CM

## 2021-02-25 DIAGNOSIS — I48.91 UNSPECIFIED ATRIAL FIBRILLATION: ICD-10-CM

## 2021-02-25 DIAGNOSIS — Z79.82 LONG TERM (CURRENT) USE OF ASPIRIN: ICD-10-CM

## 2021-02-25 DIAGNOSIS — Z79.84 LONG TERM (CURRENT) USE OF ORAL HYPOGLYCEMIC DRUGS: ICD-10-CM

## 2021-02-25 DIAGNOSIS — E11.65 TYPE 2 DIABETES MELLITUS WITH HYPERGLYCEMIA: ICD-10-CM

## 2021-02-25 DIAGNOSIS — Z86.73 PERSONAL HISTORY OF TRANSIENT ISCHEMIC ATTACK (TIA), AND CEREBRAL INFARCTION WITHOUT RESIDUAL DEFICITS: ICD-10-CM

## 2021-02-25 DIAGNOSIS — I10 ESSENTIAL (PRIMARY) HYPERTENSION: ICD-10-CM

## 2021-02-25 DIAGNOSIS — R33.8 OTHER RETENTION OF URINE: ICD-10-CM

## 2021-03-01 ENCOUNTER — APPOINTMENT (OUTPATIENT)
Dept: UROLOGY | Facility: CLINIC | Age: 86
End: 2021-03-01
Payer: MEDICARE

## 2021-03-01 VITALS
SYSTOLIC BLOOD PRESSURE: 136 MMHG | BODY MASS INDEX: 30.73 KG/M2 | HEART RATE: 76 BPM | HEIGHT: 64 IN | DIASTOLIC BLOOD PRESSURE: 65 MMHG | WEIGHT: 180 LBS

## 2021-03-01 PROCEDURE — 99215 OFFICE O/P EST HI 40 MIN: CPT

## 2021-03-01 RX ORDER — DOXYCYCLINE HYCLATE 100 MG/1
100 CAPSULE ORAL TWICE DAILY
Qty: 28 | Refills: 0 | Status: ACTIVE | COMMUNITY
Start: 2021-03-01 | End: 1900-01-01

## 2021-03-01 RX ORDER — SILODOSIN 8 MG/1
8 CAPSULE ORAL DAILY
Qty: 30 | Refills: 11 | Status: ACTIVE | COMMUNITY
Start: 2021-03-01 | End: 1900-01-01

## 2021-03-01 RX ORDER — SILODOSIN 8 MG/1
8 CAPSULE ORAL
Qty: 30 | Refills: 11 | Status: ACTIVE | COMMUNITY
Start: 2021-03-01 | End: 1900-01-01

## 2021-03-01 RX ORDER — DOXYCYCLINE HYCLATE 100 MG/1
100 CAPSULE ORAL
Qty: 28 | Refills: 0 | Status: ACTIVE | COMMUNITY
Start: 2021-03-01 | End: 1900-01-01

## 2021-03-02 ENCOUNTER — RESULT REVIEW (OUTPATIENT)
Age: 86
End: 2021-03-02

## 2021-03-04 LAB
APPEARANCE: ABNORMAL
BACTERIA UR CULT: NORMAL
BACTERIA: NEGATIVE
BILIRUBIN URINE: NEGATIVE
BLOOD URINE: NEGATIVE
COLOR: YELLOW
GLUCOSE QUALITATIVE U: NEGATIVE
HYALINE CASTS: 0 /LPF
KETONES URINE: NEGATIVE
LEUKOCYTE ESTERASE URINE: NEGATIVE
MICROSCOPIC-UA: NORMAL
NITRITE URINE: NEGATIVE
PH URINE: 6
PROTEIN URINE: NORMAL
PSA SERPL-MCNC: 14.6 NG/ML
RED BLOOD CELLS URINE: 1 /HPF
SPECIFIC GRAVITY URINE: 1.02
SQUAMOUS EPITHELIAL CELLS: 1 /HPF
URIC ACID CRYSTALS: ABNORMAL
URINE CYTOLOGY: NORMAL
UROBILINOGEN URINE: NORMAL
WHITE BLOOD CELLS URINE: 1 /HPF

## 2021-03-05 ENCOUNTER — NON-APPOINTMENT (OUTPATIENT)
Age: 86
End: 2021-03-05

## 2021-03-05 NOTE — ADDENDUM
[FreeTextEntry1] : I, Ruthie Lein, acted solely as a scribe for Dr. Patel Jesus on this date 03/01/2021.\par \par All medical record entries made by the Scribe were at my, Dr. Patel Jesus, direction and personally dictated by me on 03/01/2021. I have reviewed the chart and agree that the record accurately reflects my personal performance of the history, physical exam, assessment and plan.  I have also personally directed, reviewed and agreed with the chart.

## 2021-03-05 NOTE — HISTORY OF PRESENT ILLNESS
[FreeTextEntry1] : 03/01/2021: Mr. Craig is a 86y/o M presenting today for evaluation of BPH. Presents with daughter and her  who is the patient's primary care physician. Starting 1 month ago, began having burning at the end of urination, urinary frequency, and urinary urge incontinence. Was given Cipro x 10 days but stopped at day 7. Then given Bactrim x 12 days. One morning, had difficulty getting up and felt weak and went to ER. Creatinine was 2.4 and sodium 124. Returned to normal after catheterization and hydrating him. 2/18/21 CT scan showed hiatal hernia and enlarged prostate gland. Today, continues to have burning, frequency, and urge incontinence. Urinated 11x yesterday. Feels he does not empty well. Had ECHO that showed aortic stenosis. Has ESPARZA but no CP. No hx of prostate procedure or injury to urethra. PSA has been elevated in the past. Had been on Finasteride in the past. Admits some lightheadedness due to medications. S/p CVA in 2013. PMHx thalassemia minor, DM, large melanoma on chest which was removed 1 month ago. On Metformin. HbA1c 6.7.

## 2021-03-05 NOTE — ASSESSMENT
[FreeTextEntry1] : 03/01/2021: Mr. Craig is a 86y/o M presenting today for evaluation of BPH. Presents with daughter and her  who is the patient's primary care physician. Starting 1 month ago, began having burning at the end of urination, urinary frequency, and urinary urge incontinence. Was given Cipro x 10 days but stopped at day 7. Then given Bactrim x 12 days. One morning, had difficulty getting up and felt weak and went to ER. Creatinine was 2.4 and sodium 124. Returned to normal after catheterization and hydrating him. 2/18/21 CT scan showed hiatal hernia and enlarged prostate gland. Today, continues to have burning, frequency, and urge incontinence. Urinated 11x yesterday. Feels he does not empty well. Had ECHO that showed aortic stenosis. Has ESPARZA but no CP. No hx of prostate procedure or injury to urethra. PSA has been elevated in the past. Had been on Finasteride in the past. Admits some lightheadedness due to medications. S/p CVA in 2013. PMHx thalassemia minor, DM, large melanoma on chest which was removed 1 month ago. On Metformin. HbA1c 6.7. \par \par PVR is 5mL. \par \par Urethral meatus dilated using a blue cone dilator. \par \par I prescribed the patient Silodosin 8mg, one capsule once a day with food for BPH. I informed the patient about nasal congestion and lack of seminal emission as side effects. Check BP at home. \par I prescribed the patient Doxycycline 100mg, one capsule every 12 hours x 2 weeks for his burning and urinary frequency. I informed the patient to avoid excessive amount of direct sunlight while on this medication. \par \par The patient produced a urine sample which will be sent for urinalysis and urine culture. Did not produce enough for urine cytology today, but will provide at a later time. \par Blood work today included PSA, and testosterone. \par \par RTO in 10 days for reassessment. \par \par Preparation, in-person office visit, and coordination of care took: 60 minutes

## 2021-03-05 NOTE — LETTER HEADER
[FreeTextEntry3] : Patel Jesus MD, PhD\par [facility]\par [address]\par [address] [place] [address]\[place] [address]

## 2021-03-05 NOTE — LETTER BODY
[Dear  ___] : Dear  [unfilled], [Consult Letter:] : I had the pleasure of evaluating your patient, [unfilled]. [Please see my note below.] : Please see my note below. [Consult Closing:] : Thank you very much for allowing me to participate in the care of this patient.  If you have any questions, please do not hesitate to contact me. [Sincerely,] : Sincerely, [DrDenisa  ___] : Dr. ALVARENGA [FreeTextEntry2] : Dr. Will Campos \par [address] [facility] [address]\[place] [address] [FreeTextEntry1] : 03/01/2021: Mr. Craig is a 88y/o M presenting today for evaluation of BPH. Presents with daughter and her  who is the patient's primary care physician. Starting 1 month ago, began having burning at the end of urination, urinary frequency, and urinary urge incontinence. Was given Cipro x 10 days but stopped at day 7. Then given Bactrim x 12 days. One morning, had difficulty getting up and felt weak and went to ER. Creatinine was 2.4 and sodium 124. Returned to normal after catheterization and hydrating him. 2/18/21 CT scan showed hiatal hernia and enlarged prostate gland. Today, continues to have burning, frequency, and urge incontinence. Urinated 11x yesterday. Feels he does not empty well. Had ECHO that showed aortic stenosis. Has ESPARZA but no CP. No hx of prostate procedure or injury to urethra. PSA has been elevated in the past. Had been on Finasteride in the past. Admits some lightheadedness due to medications. S/p CVA in 2013. PMHx thalassemia minor, DM, large melanoma on chest which was removed 1 month ago. On Metformin. HbA1c 6.7. \par \par PVR is 5mL. \par \par Urethral meatus dilated using a blue cone dilator. \par \par I prescribed the patient Silodosin 8mg, one capsule once a day with food for BPH. I informed the patient about nasal congestion and lack of seminal emission as side effects. Check BP at home. \par I prescribed the patient Doxycycline 100mg, one capsule every 12 hours x 2 weeks for his burning and urinary frequency. I informed the patient to avoid excessive amount of direct sunlight while on this medication. \par \par The patient produced a urine sample which will be sent for urinalysis and urine culture. Did not produce enough for urine cytology today, but will provide at a later time. \par Blood work today included PSA, and testosterone. \par \par RTO in 10 days for reassessment.  [FreeTextEntry3] : Patel Jesus MD, PhD

## 2021-03-05 NOTE — PHYSICAL EXAM
[General Appearance - Well Developed] : well developed [Normal Appearance] : normal appearance [General Appearance - In No Acute Distress] : no acute distress [Edema] : no peripheral edema [Respiration, Rhythm And Depth] : normal respiratory rhythm and effort [Exaggerated Use Of Accessory Muscles For Inspiration] : no accessory muscle use [Abdomen Soft] : soft [Abdomen Tenderness] : non-tender [] : no rash [No Focal Deficits] : no focal deficits [Oriented To Time, Place, And Person] : oriented to person, place, and time [Affect] : the affect was normal [Mood] : the mood was normal [Not Anxious] : not anxious [FreeTextEntry1] : walks with a cane

## 2021-03-06 LAB
TESTOST BND SERPL-MCNC: 2.6 PG/ML
TESTOST SERPL-MCNC: 368.8 NG/DL

## 2021-03-10 ENCOUNTER — APPOINTMENT (OUTPATIENT)
Dept: UROLOGY | Facility: CLINIC | Age: 86
End: 2021-03-10
Payer: MEDICARE

## 2021-03-10 VITALS
HEART RATE: 65 BPM | RESPIRATION RATE: 16 BRPM | TEMPERATURE: 97.9 F | BODY MASS INDEX: 30.73 KG/M2 | SYSTOLIC BLOOD PRESSURE: 160 MMHG | DIASTOLIC BLOOD PRESSURE: 65 MMHG | WEIGHT: 180 LBS | HEIGHT: 64 IN

## 2021-03-10 DIAGNOSIS — R33.9 RETENTION OF URINE, UNSPECIFIED: ICD-10-CM

## 2021-03-10 DIAGNOSIS — R39.9 UNSPECIFIED SYMPTOMS AND SIGNS INVOLVING THE GENITOURINARY SYSTEM: ICD-10-CM

## 2021-03-10 DIAGNOSIS — R35.0 FREQUENCY OF MICTURITION: ICD-10-CM

## 2021-03-10 DIAGNOSIS — R97.20 ELEVATED PROSTATE, SPECIFIC ANTIGEN [PSA]: ICD-10-CM

## 2021-03-10 PROCEDURE — 99214 OFFICE O/P EST MOD 30 MIN: CPT

## 2021-03-10 RX ORDER — PHENAZOPYRIDINE HYDROCHLORIDE 200 MG/1
200 TABLET ORAL 3 TIMES DAILY
Qty: 30 | Refills: 3 | Status: ACTIVE | COMMUNITY
Start: 2021-03-10 | End: 1900-01-01

## 2021-03-10 NOTE — REASON FOR VISIT
[Follow-up Visit ___] : a follow-up visit  for [unfilled] [Family Member] : family member [Initial Visit ___] : [unfilled] is here today for an initial visit  for [unfilled]

## 2021-03-13 LAB
APPEARANCE: CLEAR
BACTERIA UR CULT: NORMAL
BACTERIA: NEGATIVE
BILIRUBIN URINE: NEGATIVE
BLOOD URINE: NEGATIVE
CALCIUM OXALATE CRYSTALS: ABNORMAL
COLOR: NORMAL
GLUCOSE QUALITATIVE U: NEGATIVE
HYALINE CASTS: 1 /LPF
KETONES URINE: NEGATIVE
LEUKOCYTE ESTERASE URINE: NEGATIVE
MICROSCOPIC-UA: NORMAL
NITRITE URINE: NEGATIVE
PH URINE: 5.5
PROTEIN URINE: NORMAL
RED BLOOD CELLS URINE: 4 /HPF
SPECIFIC GRAVITY URINE: 1.02
SQUAMOUS EPITHELIAL CELLS: 0 /HPF
URINE CYTOLOGY: NORMAL
UROBILINOGEN URINE: NORMAL
WHITE BLOOD CELLS URINE: 1 /HPF

## 2021-03-14 PROBLEM — R35.0 INCREASED URINARY FREQUENCY: Status: ACTIVE | Noted: 2021-03-01

## 2021-03-14 PROBLEM — R33.9 URINARY RETENTION: Status: ACTIVE | Noted: 2017-04-17

## 2021-03-14 PROBLEM — R33.9 INCOMPLETE EMPTYING OF BLADDER: Status: ACTIVE | Noted: 2017-04-17

## 2021-03-14 PROBLEM — R97.20 ELEVATED PSA: Status: ACTIVE | Noted: 2021-03-04

## 2021-03-14 PROBLEM — R39.9 LOWER URINARY TRACT SYMPTOMS (LUTS): Status: ACTIVE | Noted: 2021-02-23

## 2021-03-14 NOTE — ASSESSMENT
[FreeTextEntry1] : 03/01/2021: Mr. Craig is a 86y/o M presenting today for evaluation of BPH. Presents with daughter and her  who is the patient's primary care physician. Starting 1 month ago, began having burning at the end of urination, urinary frequency, and urinary urge incontinence. Was given Cipro x 10 days but stopped at day 7. Then given Bactrim x 12 days. One morning, had difficulty getting up and felt weak and went to ER. Creatinine was 2.4 and sodium 124. Returned to normal after catheterization and hydrating him. 2/18/21 CT scan showed hiatal hernia and enlarged prostate gland. Today, continues to have burning, frequency, and urge incontinence. Urinated 11x yesterday. Feels he does not empty well. Had ECHO that showed aortic stenosis. Has ESPARZA but no CP. No hx of prostate procedure or injury to urethra. PSA has been elevated in the past. Had been on Finasteride in the past. Admits some lightheadedness due to medications. S/p CVA in 2013. PMHx thalassemia minor, DM, large melanoma on chest which was removed 1 month ago. On Metformin. HbA1c 6.7. \par \par I prescribed the patient Silodosin 8mg, one capsule once a day with food for BPH. I informed the patient about nasal congestion and lack of seminal emission as side effects. Check BP at home. \par I prescribed the patient Doxycycline 100mg, one capsule every 12 hours x 2 weeks for his burning and urinary frequency. I informed the patient to avoid excessive amount of direct sunlight while on this medication. \par \par 03/10/2021: Patient presents today for follow up. Last patient, pt was prescribed Silodosin but unable to obtain it due to high insurance costs. Currently takes Tamsulosin. Has been taking Doxycycline, however, daughter is unsure if it has improved his urination. Pt states he will have burning when urine volume is small but no burning when urine volume is large. Has upcoming appointment for bone scan. \par \par Patient will try using [employer] to lower cost of Silodosin. If he is able to obtain Silodosin, he will take it instead of Tamsulosin.\par \par Finish course of Doxycycline. I will add Pyridium 200mg TID x 10 days. If patient continues to feel burning, we will add Tadalafil 5mg once daily. I informed the patient that they may experience tingling of the skin, warm flushed feeling, heartburn, backache, and on rare occasion, visual or hearing disturbances. \par \par RTO in 3 weeks for follow up. \par \par Preparation, in-person office visit, and coordination of care took:30 minutes

## 2021-03-14 NOTE — PHYSICAL EXAM
[General Appearance - Well Developed] : well developed [Normal Appearance] : normal appearance [General Appearance - In No Acute Distress] : no acute distress [Abdomen Soft] : soft [Abdomen Tenderness] : non-tender [Edema] : no peripheral edema [] : no respiratory distress [Respiration, Rhythm And Depth] : normal respiratory rhythm and effort [Exaggerated Use Of Accessory Muscles For Inspiration] : no accessory muscle use [Oriented To Time, Place, And Person] : oriented to person, place, and time [Affect] : the affect was normal [Mood] : the mood was normal [Not Anxious] : not anxious [No Focal Deficits] : no focal deficits [FreeTextEntry1] : walks with a cane

## 2021-03-14 NOTE — ADDENDUM
[FreeTextEntry1] : I, Ruthie Lein, acted solely as a scribe for Dr. Patel Jesus on this date 03/10/2021.\par \par All medical record entries made by the Scribe were at my, Dr. Patel Jesus, direction and personally dictated by me on 03/10/2021. I have reviewed the chart and agree that the record accurately reflects my personal performance of the history, physical exam, assessment and plan.  I have also personally directed, reviewed and agreed with the chart.

## 2021-03-14 NOTE — HISTORY OF PRESENT ILLNESS
[FreeTextEntry1] : 03/01/2021: Mr. Craig is a 86y/o M presenting today for evaluation of BPH. Presents with daughter and her  who is the patient's primary care physician. Starting 1 month ago, began having burning at the end of urination, urinary frequency, and urinary urge incontinence. Was given Cipro x 10 days but stopped at day 7. Then given Bactrim x 12 days. One morning, had difficulty getting up and felt weak and went to ER. Creatinine was 2.4 and sodium 124. Returned to normal after catheterization and hydrating him. 2/18/21 CT scan showed hiatal hernia and enlarged prostate gland. Today, continues to have burning, frequency, and urge incontinence. Urinated 11x yesterday. Feels he does not empty well. Had ECHO that showed aortic stenosis. Has ESPARZA but no CP. No hx of prostate procedure or injury to urethra. PSA has been elevated in the past. Had been on Finasteride in the past. Admits some lightheadedness due to medications. S/p CVA in 2013. PMHx thalassemia minor, DM, large melanoma on chest which was removed 1 month ago. On Metformin. HbA1c 6.7. \par \par 03/10/2021: Patient presents today for follow up. Last patient, pt was prescribed Silodosin but unable to obtain it due to high insurance costs. Currently takes Tamsulosin. Has been taking Doxycycline, however, daughter is unsure if it has improved his urination. Pt states he will have burning when urine volume is small but no burning when urine volume is large. Has upcoming appointment for bone scan.

## 2021-04-29 ENCOUNTER — APPOINTMENT (OUTPATIENT)
Dept: UROLOGY | Facility: CLINIC | Age: 86
End: 2021-04-29

## 2021-11-22 NOTE — PHYSICAL THERAPY INITIAL EVALUATION ADULT - STANDING BALANCE: STATIC
We reviewed pertinent physiology and pathophysiology via drawings.  We reviewed pertinent data available in terms of results and findings as well as what they mean and the rationale for specific labs.  For those with confirmed primary hyperparathyroidism, I reviewed the surgical criteria and also discussed watchful waiting. Her calcium has been running higher over the past 12 months versus in the past - discussed proceeding with surgery versus lab recheck. Her calcium corrected is 11.3. on her pre office visit labs (upper end of normal is 10.2). She is a bit hesitant to proceed with surgery but plan on lab recheck in January and tentatively plan on appointment in 1 year (with lab and bone densitometry). If proceeds to surgery, will shift follow-up a bit to have an appointment 6 weeks after with lab prior.     We discussed relevant details regarding medications as well as follow-up testing plus appointments as needed.  Additional relevant information placed within the AVS.      I answered all of her questions and she is comfortable with the plan as outlined. she voices understanding of the follow-up plan including labs, office visits, and was provided with an after visit summary.  We discussed signs and symptoms that should provoke a phone call. she will call our office with any further questions, concerns, or problems.     good balance

## 2022-07-20 ENCOUNTER — EMERGENCY (EMERGENCY)
Facility: HOSPITAL | Age: 87
LOS: 0 days | Discharge: HOME | End: 2022-07-21
Attending: EMERGENCY MEDICINE | Admitting: EMERGENCY MEDICINE

## 2022-07-20 VITALS
OXYGEN SATURATION: 97 % | HEIGHT: 65 IN | HEART RATE: 88 BPM | RESPIRATION RATE: 18 BRPM | SYSTOLIC BLOOD PRESSURE: 217 MMHG | TEMPERATURE: 98 F | DIASTOLIC BLOOD PRESSURE: 97 MMHG | WEIGHT: 169.98 LBS

## 2022-07-20 DIAGNOSIS — R52 PAIN, UNSPECIFIED: ICD-10-CM
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
## 2022-07-20 DIAGNOSIS — Z86.73 PERSONAL HISTORY OF TRANSIENT ISCHEMIC ATTACK (TIA), AND CEREBRAL INFARCTION WITHOUT RESIDUAL DEFICITS: ICD-10-CM

## 2022-07-20 DIAGNOSIS — Z79.82 LONG TERM (CURRENT) USE OF ASPIRIN: ICD-10-CM

## 2022-07-20 DIAGNOSIS — E78.00 PURE HYPERCHOLESTEROLEMIA, UNSPECIFIED: ICD-10-CM

## 2022-07-20 DIAGNOSIS — S32.009A UNSPECIFIED FRACTURE OF UNSPECIFIED LUMBAR VERTEBRA, INITIAL ENCOUNTER FOR CLOSED FRACTURE: ICD-10-CM
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
## 2022-07-20 DIAGNOSIS — S09.90XA UNSPECIFIED INJURY OF HEAD, INITIAL ENCOUNTER: ICD-10-CM

## 2022-07-20 DIAGNOSIS — Z79.84 LONG TERM (CURRENT) USE OF ORAL HYPOGLYCEMIC DRUGS: ICD-10-CM
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
## 2022-07-20 DIAGNOSIS — E11.9 TYPE 2 DIABETES MELLITUS WITHOUT COMPLICATIONS: ICD-10-CM

## 2022-07-20 DIAGNOSIS — N39.0 URINARY TRACT INFECTION, SITE NOT SPECIFIED: ICD-10-CM

## 2022-07-20 DIAGNOSIS — I10 ESSENTIAL (PRIMARY) HYPERTENSION: ICD-10-CM

## 2022-07-20 DIAGNOSIS — Z98.890 OTHER SPECIFIED POSTPROCEDURAL STATES: Chronic | ICD-10-CM

## 2022-07-20 DIAGNOSIS — F32.A DEPRESSION, UNSPECIFIED: ICD-10-CM

## 2022-07-20 DIAGNOSIS — N40.1 BENIGN PROSTATIC HYPERPLASIA WITH LOWER URINARY TRACT SYMPTOMS: ICD-10-CM

## 2022-07-20 DIAGNOSIS — Z85.820 PERSONAL HISTORY OF MALIGNANT MELANOMA OF SKIN: ICD-10-CM
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
## 2022-07-20 DIAGNOSIS — Z87.891 PERSONAL HISTORY OF NICOTINE DEPENDENCE: ICD-10-CM

## 2022-07-20 LAB
ALBUMIN SERPL ELPH-MCNC: 4.5 G/DL — SIGNIFICANT CHANGE UP (ref 3.5–5.2)
ALP SERPL-CCNC: 83 U/L — SIGNIFICANT CHANGE UP (ref 30–115)
ALT FLD-CCNC: 18 U/L — SIGNIFICANT CHANGE UP (ref 0–41)
ANION GAP SERPL CALC-SCNC: 15 MMOL/L — HIGH (ref 7–14)
APPEARANCE UR: CLEAR — SIGNIFICANT CHANGE UP
APTT BLD: 27.5 SEC — SIGNIFICANT CHANGE UP (ref 27–39.2)
AST SERPL-CCNC: 22 U/L — SIGNIFICANT CHANGE UP (ref 0–41)
BASOPHILS # BLD AUTO: 0.01 K/UL — SIGNIFICANT CHANGE UP (ref 0–0.2)
BASOPHILS NFR BLD AUTO: 0.1 % — SIGNIFICANT CHANGE UP (ref 0–1)
BILIRUB SERPL-MCNC: 2.4 MG/DL — HIGH (ref 0.2–1.2)
BILIRUB UR-MCNC: NEGATIVE — SIGNIFICANT CHANGE UP
BUN SERPL-MCNC: 16 MG/DL — SIGNIFICANT CHANGE UP (ref 10–20)
CALCIUM SERPL-MCNC: 9.1 MG/DL — SIGNIFICANT CHANGE UP (ref 8.5–10.1)
CHLORIDE SERPL-SCNC: 98 MMOL/L — SIGNIFICANT CHANGE UP (ref 98–110)
CO2 SERPL-SCNC: 25 MMOL/L — SIGNIFICANT CHANGE UP (ref 17–32)
COLOR SPEC: SIGNIFICANT CHANGE UP
CREAT SERPL-MCNC: 0.8 MG/DL — SIGNIFICANT CHANGE UP (ref 0.7–1.5)
DIFF PNL FLD: ABNORMAL
EGFR: 85 ML/MIN/1.73M2 — SIGNIFICANT CHANGE UP
EOSINOPHIL # BLD AUTO: 0.03 K/UL — SIGNIFICANT CHANGE UP (ref 0–0.7)
EOSINOPHIL NFR BLD AUTO: 0.3 % — SIGNIFICANT CHANGE UP (ref 0–8)
GLUCOSE SERPL-MCNC: 131 MG/DL — HIGH (ref 70–99)
GLUCOSE UR QL: SIGNIFICANT CHANGE UP
IMM GRANULOCYTES NFR BLD AUTO: 0.4 % — HIGH (ref 0.1–0.3)
INR BLD: 1.21 RATIO — SIGNIFICANT CHANGE UP (ref 0.65–1.3)
KETONES UR-MCNC: NEGATIVE — SIGNIFICANT CHANGE UP
LACTATE SERPL-SCNC: 2.3 MMOL/L — HIGH (ref 0.7–2)
LEUKOCYTE ESTERASE UR-ACNC: ABNORMAL
LIDOCAIN IGE QN: 20 U/L — SIGNIFICANT CHANGE UP (ref 7–60)
LYMPHOCYTES # BLD AUTO: 1.6 K/UL — SIGNIFICANT CHANGE UP (ref 1.2–3.4)
LYMPHOCYTES # BLD AUTO: 17.1 % — LOW (ref 20.5–51.1)
MONOCYTES # BLD AUTO: 0.7 K/UL — HIGH (ref 0.1–0.6)
MONOCYTES NFR BLD AUTO: 7.5 % — SIGNIFICANT CHANGE UP (ref 1.7–9.3)
NEUTROPHILS # BLD AUTO: 6.98 K/UL — HIGH (ref 1.4–6.5)
NEUTROPHILS NFR BLD AUTO: 74.6 % — SIGNIFICANT CHANGE UP (ref 42.2–75.2)
NITRITE UR-MCNC: POSITIVE
PH UR: 6.5 — SIGNIFICANT CHANGE UP (ref 5–8)
POTASSIUM SERPL-MCNC: 4.1 MMOL/L — SIGNIFICANT CHANGE UP (ref 3.5–5)
POTASSIUM SERPL-SCNC: 4.1 MMOL/L — SIGNIFICANT CHANGE UP (ref 3.5–5)
PROT SERPL-MCNC: 6.7 G/DL — SIGNIFICANT CHANGE UP (ref 6–8)
PROT UR-MCNC: SIGNIFICANT CHANGE UP
PROTHROM AB SERPL-ACNC: 13.9 SEC — HIGH (ref 9.95–12.87)
SODIUM SERPL-SCNC: 138 MMOL/L — SIGNIFICANT CHANGE UP (ref 135–146)
SP GR SPEC: 1.01 — SIGNIFICANT CHANGE UP (ref 1.01–1.03)
UROBILINOGEN FLD QL: SIGNIFICANT CHANGE UP

## 2022-07-20 PROCEDURE — 71045 X-RAY EXAM CHEST 1 VIEW: CPT | Mod: 26

## 2022-07-20 PROCEDURE — 99284 EMERGENCY DEPT VISIT MOD MDM: CPT | Mod: FS

## 2022-07-20 PROCEDURE — 93010 ELECTROCARDIOGRAM REPORT: CPT

## 2022-07-20 PROCEDURE — 72170 X-RAY EXAM OF PELVIS: CPT | Mod: 26

## 2022-07-20 PROCEDURE — 73630 X-RAY EXAM OF FOOT: CPT | Mod: 26,RT

## 2022-07-20 RX ORDER — SODIUM CHLORIDE 9 MG/ML
250 INJECTION INTRAMUSCULAR; INTRAVENOUS; SUBCUTANEOUS ONCE
Refills: 0 | Status: COMPLETED | OUTPATIENT
Start: 2022-07-20 | End: 2022-07-20

## 2022-07-20 RX ADMIN — SODIUM CHLORIDE 250 MILLILITER(S): 9 INJECTION INTRAMUSCULAR; INTRAVENOUS; SUBCUTANEOUS at 21:41

## 2022-07-20 NOTE — ED ADULT NURSE NOTE - OBJECTIVE STATEMENT
88 y male presenting to ED s/p fall yesterday from ladder, presenting with lower back pain. Pt states he hit his head but denies LOC

## 2022-07-20 NOTE — ED PROVIDER NOTE - NSFOLLOWUPINSTRUCTIONS_ED_ALL_ED_FT
Closed Head Injury    Closed head injury in an injury to your head that may or may not involve a traumatic brain injury (TBI). Symptoms of TBI can be short or long lasting and include headache, dizziness, interference with memory or speech, fatigue, confusion, changes in sleep, mood changes, nausea, depression/anxiety, and dulling of senses. Make sure to obtain proper rest which includes getting plenty of sleep, avoiding excessive visual stimulation, and avoiding activities that may cause physical or mental stress. Avoid any situation where there is potential for another head injury including sports.    SEEK MEDICAL CARE IF YOU HAVE THE FOLLOWING SYMPTOMS: unusual drowsiness, vomiting, severe dizziness, seizures, lightheadedness, muscular weakness, different pupil sizes, visual changes, or clear or bloody discharge from your ears or nose.     Urinary Tract Infection    A urinary tract infection (UTI) is an infection of any part of the urinary tract, which includes the kidneys, ureters, bladder, and urethra. Risk factors include ignoring your need to urinate, wiping back to front if female, being an uncircumcised male, and having diabetes or a weak immune system. Symptoms include frequent urination, pain or burning with urination, foul smelling urine, cloudy urine, pain in the lower abdomen, blood in the urine, and fever. If you were prescribed an antibiotic medicine, take it as told by your health care provider. Do not stop taking the antibiotic even if you start to feel better.    SEEK IMMEDIATE MEDICAL CARE IF YOU HAVE THE FOLLOWING SYMPTOMS: severe back or abdominal pain, inability to keep fluids or medicine down, dizziness/lightheadedness, or a change in mental status. Lumbar Spine Fracture (Stable non-displaced anterior superior vertebral body fracture)    A lumbar spine fracture is a break in one of the bones of the lower back. Lumbar spine fractures can vary from mild to severe. The most severe types are those that:  •Cause the broken bones to move out of place (unstable).  •Injure or press on the spinal cord.    During recovery, it is normal to have pain and stiffness in the lower back for weeks.    What are the causes?  This condition may be caused by:  •A fall.  •A car accident.  •A gunshot wound.  •A hard, direct hit to the back.    What increases the risk?    You are more likely to develop this condition if:  •You are in a situation that could result in a fall or other violent injury.  •You have a condition that causes weakness in the bones (osteoporosis).    What are the signs or symptoms?  The main symptom of this condition is severe pain in the lower back. If a fracture is complex or severe, there may also be:  •A misshapen or swollen area on the lower back.  •Limited ability to move an area of the lower back.  •Inability to empty the bladder (urinary retention).  •Loss of bowel or bladder control (incontinence).  •Loss of strength or sensation in the legs, feet, and toes.  •Inability to move (paralysis).    How is this diagnosed?    This condition is diagnosed based on:  •A physical exam.  •Symptoms and what happened just before they developed.  •The results of imaging tests, such as an X-ray, CT scan, or MRI.    If your nerves have been damaged, you may also have other tests to find out the extent of the damage.    How is this treated?    Treatment for this condition depends on how severe the injury is. Most fractures can be treated with:  •A back brace.  •Bed rest and activity restrictions.  •Pain medicine.  •Physical therapy.    Fractures that are complex, involve multiple bones, or make the spine unstable may require surgery. Surgery is done:  •To remove pressure from the nerves or spinal cord.  •To stabilize the broken pieces of bone.    Follow these instructions at home:    Medicines   •Take over-the-counter and prescription medicines only as told by your health care provider.  • Do not drive or use heavy machinery while taking prescription pain medicine.  •If you are taking prescription pain medicine, take actions to prevent or treat constipation. Your health care provider may recommend that you:   •Drink enough fluid to keep your urine pale yellow.   •Eat foods that are high in fiber, such as fresh fruits and vegetables, whole grains, and beans.   •Limit foods that are high in fat and processed sugars, such as fried or sweet foods.   •Take an over-the-counter or prescription medicine for constipation.    If you have a brace:   •Wear the back brace as told by your health care provider. Remove it only as told by your health care provider.  •Keep the brace clean.  •If the brace is not waterproof:  •Do not let it get wet.  •Cover it with a watertight covering when you take a bath or a shower.    Activity   •Stay in bed (on bed rest) only as directed by your health care provider.  •Do exercises to improve motion and strength in your back (physical therapy), if your health care provider tells you to do so.  •Return to your normal activities as directed by your health care provider. Ask your health care provider what activities are safe for you.    Managing pain, stiffness, and swelling   •If directed, put ice on the injured area:  •If you have a removable brace, remove it as told by your health care provider.  •Put ice in a plastic bag.  •Place a towel between your skin and the bag.  •Leave the ice on for 20 minutes, 2–3 times a day.    General instructions   • Do not use any products that contain nicotine or tobacco, such as cigarettes and e-cigarettes. These can delay healing after injury. If you need help quitting, ask your health care provider.  • Do not drink alcohol. Alcohol can interfere with your treatment.  •Keep all follow-up visits as directed by your health care provider. This is important.   •Failing to follow up as recommended could result in permanent injury, disability, or long-lasting (chronic) pain.      Contact a health care provider if:  •You have a fever.  •Your pain medicine is not helping.  •Your pain does not get better over time.  •You cannot return to your normal activities as planned or expected.    Get help right away if:  •You have difficulty breathing.  •Your pain is very bad and it suddenly gets worse.  •You have numbness, tingling, or weakness in any part of your body.  •You are unable to empty your bladder.  •You cannot control your bladder or bowels.  •You are unable to move any body part (paralysis) that is below the level of your injury.  •You vomit.  •You have pain in your abdomen.    Summary  •A lumbar spine fracture is a break in one of the bones of the lower back.  •The main symptom of this condition is severe pain in the lower back. If a fracture is complex, there may also be numbness, tingling, or paralysis in the legs.  •Treatment depends on how severe the injury is. Most fractures can be treated with a back brace, bed rest and activity restrictions, pain medicine, and physical therapy.  •Fractures that are complex, involve multiple bones, or make the spine unstable may require surgery.    This information is not intended to replace advice given to you by your health care provider. Make sure you discuss any questions you have with your health care provider.      Closed Head Injury    Closed head injury in an injury to your head that may or may not involve a traumatic brain injury (TBI). Symptoms of TBI can be short or long lasting and include headache, dizziness, interference with memory or speech, fatigue, confusion, changes in sleep, mood changes, nausea, depression/anxiety, and dulling of senses. Make sure to obtain proper rest which includes getting plenty of sleep, avoiding excessive visual stimulation, and avoiding activities that may cause physical or mental stress. Avoid any situation where there is potential for another head injury including sports.    SEEK MEDICAL CARE IF YOU HAVE THE FOLLOWING SYMPTOMS: unusual drowsiness, vomiting, severe dizziness, seizures, lightheadedness, muscular weakness, different pupil sizes, visual changes, or clear or bloody discharge from your ears or nose.     Urinary Tract Infection    A urinary tract infection (UTI) is an infection of any part of the urinary tract, which includes the kidneys, ureters, bladder, and urethra. Risk factors include ignoring your need to urinate, wiping back to front if female, being an uncircumcised male, and having diabetes or a weak immune system. Symptoms include frequent urination, pain or burning with urination, foul smelling urine, cloudy urine, pain in the lower abdomen, blood in the urine, and fever. If you were prescribed an antibiotic medicine, take it as told by your health care provider. Do not stop taking the antibiotic even if you start to feel better.    SEEK IMMEDIATE MEDICAL CARE IF YOU HAVE THE FOLLOWING SYMPTOMS: severe back or abdominal pain, inability to keep fluids or medicine down, dizziness/lightheadedness, or a change in mental status.

## 2022-07-20 NOTE — ED PROVIDER NOTE - PHYSICAL EXAMINATION
CONSTITUTIONAL: Well-appearing; well-nourished; in no apparent distress.   EYES: PERRL; EOM intact.   CARDIOVASCULAR: Normal S1, S2; no murmurs, rubs, or gallops.   RESPIRATORY: Normal chest excursion with respiration; breath sounds clear and equal bilaterally; no wheezes, rhonchi, or rales.  GI/: Normal bowel sounds; non-distended; non-tender; no palpable organomegaly.   MS: No midline bony tenderness to neck and back. neck/ back with FROM without pain.  All extremities without deformity and tenderness. FROM without pain. ambulate with nml and steady gait. sensation and strength equal to b/l upper and lower extremities.    SKIN: Normal for age and race; warm; dry; good turgor; no apparent lesions or exudate.   NEURO/PSYCH: A & O x 4; grossly unremarkable.  Speaking coherently and moving all extremities

## 2022-07-20 NOTE — ED PROVIDER NOTE - ATTENDING APP SHARED VISIT CONTRIBUTION OF CARE
88-year-old male past medical history as documented, not on anticoagulation, presenting status post mechanical fall from a ladder yesterday, landing on his back.  Patient was 8 steps up on the ladder prior to the fall.  Denies head trauma or LOC.  At this time patient is complaining of primarily lower back pain but diffuse body pain as well described as achy, nondraining, worse with movement, no palliative factors, mild/moderate severity.  Patient has been able to ambulate since the fall but with difficulty.  Otherwise denies any other injuries or complaints.    Vital Signs: I have reviewed the initial vital signs.  Constitutional: NAD, well-nourished, appears stated age, no acute distress.  HEENT: Airway patent, moist MM, no erythema/swelling/deformity of oral structures. EOMI, PERRLA.  CV: regular rate, regular rhythm, well-perfused extremities, 2+ b/l DP and radial pulses equal.  Lungs: BCTA, no increased WOB.  ABD: NTND, no guarding or rebound, no pulsatile mass, no hernias.   MSK: Neck supple, nontender, nl ROM, no stepoff. Chest nontender. Back nontender in TLS spine or to b/l bony structures or flanks. Ext nontender, nl rom, no deformity.   INTEG: Skin warm, dry, no rash.  NEURO: A&Ox3, normal strength, nl sensation throughout, normal speech.   PSYCH: Calm, cooperative, normal affect and interaction.    Will correia scan for trauma, obtain labs, reeval.

## 2022-07-20 NOTE — ED ADULT NURSE NOTE - NSIMPLEMENTINTERV_GEN_ALL_ED
Implemented All Fall with Harm Risk Interventions:  [place] to call system. Call bell, personal items and telephone within reach. Instruct patient to call for assistance. Room bathroom lighting operational. Non-slip footwear when patient is off stretcher. Physically safe environment: no spills, clutter or unnecessary equipment. Stretcher in lowest position, wheels locked, appropriate side rails in place. Provide visual cue, wrist band, yellow gown, etc. Monitor gait and stability. Monitor for mental status changes and reorient to person, place, and time. Review medications for side effects contributing to fall risk. Reinforce activity limits and safety measures with patient and family. Provide visual clues: red socks.

## 2022-07-20 NOTE — ED PROVIDER NOTE - CARE PLAN
1 Principal Discharge DX:	Closed head injury  Secondary Diagnosis:	Accidental fall  Secondary Diagnosis:	Acute UTI   Principal Discharge DX:	Closed fracture of lumbar vertebral body  Secondary Diagnosis:	Accidental fall  Secondary Diagnosis:	Acute UTI  Secondary Diagnosis:	Closed head injury   Principal Discharge DX:	Closed fracture of lumbar vertebral body  Secondary Diagnosis:	Accidental fall  Secondary Diagnosis:	Acute UTI  Secondary Diagnosis:	Closed head injury  Secondary Diagnosis:	HTN (hypertension)

## 2022-07-20 NOTE — ED PROVIDER NOTE - CARE PROVIDER_API CALL
Alma Tellez)  Neurosurgery  [address], [address]  [place] [address]  Phone: [phone]  Fax: [phone]  Follow Up Time:

## 2022-07-20 NOTE — ED PROVIDER NOTE - PATIENT PORTAL LINK FT
You can access the FollowMyHealth Patient Portal offered by [facility] by registering at the following website: [email or web address]. By joining [employer]’s FollowMyHealth portal, you will also be able to view your health information using other applications (apps) compatible with our system. You can access the FollowMyHealth Patient Portal offered by Health system by registering at the following website: [email or web address]. By joining [employer]’s FollowMyHealth portal, you will also be able to view your health information using other applications (apps) compatible with our system.

## 2022-07-20 NOTE — ED PROVIDER NOTE - OBJECTIVE STATEMENT
87 yo male hx of HTN/DM/ CVA without significant deficit, on daily ASA present c/o left sided pain s/p fall from the roof of a plastic shed (8 feet). patient's son reported patient was caulking the roof of the shed. Patient reported he fell on his back and hit the back of his head. denies LOC and neck pain. denies recent illness, fever/chill/HA/dizziness/chest pain/palpitation/sob/abd pain/n/v/d/ black stool/bloody stool/urinary sxs

## 2022-07-20 NOTE — ED PROVIDER NOTE - NS ED ROS FT
Constitutional: no fever, chills, no recent weight loss, change in appetite or malaise  Eyes: no redness/discharge/pain/vision changes  ENT: no rhinorrhea/ear pain/sore throat  Cardiac: No chest pain, SOB or edema.  Respiratory: No cough or respiratory distress  GI: See HPI.  No nausea, vomiting, diarrhea   : No dysuria, frequency, urgency or hematuria  MS: See HPI  Neuro: See HPI no headache or weakness. No LOC.  Skin: No skin rash.  Endocrine: No history of thyroid disease or diabetes.

## 2022-07-20 NOTE — ED ADULT TRIAGE NOTE - CHIEF COMPLAINT QUOTE
pt brittany from home, pt fell off ladder yesterday, approx 8 rungs up ladder; pt now c/o lower back pain that worsened since yesterday; pt sts he landed on his back, hit head but no LOC, only on 81mg ASA

## 2022-07-20 NOTE — ED PROVIDER NOTE - NS ED ATTENDING STATEMENT MOD
This was a shared visit with the NORA. I reviewed and verified the documentation and independently performed the documented:

## 2022-07-21 VITALS — SYSTOLIC BLOOD PRESSURE: 222 MMHG | HEART RATE: 60 BPM | DIASTOLIC BLOOD PRESSURE: 102 MMHG

## 2022-07-21 LAB
BACTERIA # UR AUTO: ABNORMAL
EPI CELLS # UR: 0 /HPF — SIGNIFICANT CHANGE UP (ref 0–5)
ETHANOL SERPL-MCNC: <10 MG/DL — SIGNIFICANT CHANGE UP
HCT VFR BLD CALC: 38.2 % — LOW (ref 42–52)
HGB BLD-MCNC: 12 G/DL — LOW (ref 14–18)
HYALINE CASTS # UR AUTO: 1 /LPF — SIGNIFICANT CHANGE UP (ref 0–7)
MCHC RBC-ENTMCNC: 19.7 PG — LOW (ref 27–31)
MCHC RBC-ENTMCNC: 31.4 G/DL — LOW (ref 32–37)
MCV RBC AUTO: 62.6 FL — LOW (ref 80–94)
NRBC # BLD: 0 /100 WBCS — SIGNIFICANT CHANGE UP (ref 0–0)
PLATELET # BLD AUTO: 131 K/UL — SIGNIFICANT CHANGE UP (ref 130–400)
RBC # BLD: 6.1 M/UL — SIGNIFICANT CHANGE UP (ref 4.7–6.1)
RBC # FLD: 17.5 % — HIGH (ref 11.5–14.5)
RBC CASTS # UR COMP ASSIST: 2 /HPF — SIGNIFICANT CHANGE UP (ref 0–4)
SARS-COV-2 RNA SPEC QL NAA+PROBE: SIGNIFICANT CHANGE UP
WBC # BLD: 9.36 K/UL — SIGNIFICANT CHANGE UP (ref 4.8–10.8)
WBC # FLD AUTO: 9.36 K/UL — SIGNIFICANT CHANGE UP (ref 4.8–10.8)
WBC UR QL: 27 /HPF — HIGH (ref 0–5)

## 2022-07-21 PROCEDURE — 74177 CT ABD & PELVIS W/CONTRAST: CPT | Mod: 26,MA

## 2022-07-21 PROCEDURE — 70450 CT HEAD/BRAIN W/O DYE: CPT | Mod: 26,MA

## 2022-07-21 PROCEDURE — 71260 CT THORAX DX C+: CPT | Mod: 26,MA

## 2022-07-21 PROCEDURE — 72125 CT NECK SPINE W/O DYE: CPT | Mod: 26,MA

## 2022-07-21 RX ORDER — CEFUROXIME AXETIL 250 MG
1 TABLET ORAL
Qty: 14 | Refills: 0
Start: 2022-07-21 | End: 2022-07-27

## 2022-07-21 RX ORDER — AMLODIPINE BESYLATE 2.5 MG/1
5 TABLET ORAL ONCE
Refills: 0 | Status: COMPLETED | OUTPATIENT
Start: 2022-07-21 | End: 2022-07-21

## 2022-07-21 RX ORDER — CEFTRIAXONE 500 MG/1
1000 INJECTION, POWDER, FOR SOLUTION INTRAMUSCULAR; INTRAVENOUS ONCE
Refills: 0 | Status: COMPLETED | OUTPATIENT
Start: 2022-07-21 | End: 2022-07-21

## 2022-07-21 RX ADMIN — CEFTRIAXONE 100 MILLIGRAM(S): 500 INJECTION, POWDER, FOR SOLUTION INTRAMUSCULAR; INTRAVENOUS at 02:02

## 2022-07-21 RX ADMIN — AMLODIPINE BESYLATE 5 MILLIGRAM(S): 2.5 TABLET ORAL at 02:27

## 2022-07-25 ENCOUNTER — NON-APPOINTMENT (OUTPATIENT)
Age: 87
End: 2022-07-25

## 2022-07-29 ENCOUNTER — LABORATORY RESULT (OUTPATIENT)
Age: 87
End: 2022-07-29

## 2022-08-02 ENCOUNTER — NON-APPOINTMENT (OUTPATIENT)
Age: 87
End: 2022-08-02

## 2022-08-02 LAB — BACTERIA UR CULT: NORMAL

## 2022-08-04 ENCOUNTER — APPOINTMENT (OUTPATIENT)
Dept: UROLOGY | Facility: CLINIC | Age: 87
End: 2022-08-04

## 2022-08-04 VITALS — HEIGHT: 60 IN | WEIGHT: 180 LBS | BODY MASS INDEX: 35.34 KG/M2

## 2022-08-04 DIAGNOSIS — N40.1 BENIGN PROSTATIC HYPERPLASIA WITH LOWER URINARY TRACT SYMPMS: ICD-10-CM

## 2022-08-04 DIAGNOSIS — M54.50 LOW BACK PAIN, UNSPECIFIED: ICD-10-CM

## 2022-08-04 DIAGNOSIS — N13.8 BENIGN PROSTATIC HYPERPLASIA WITH LOWER URINARY TRACT SYMPMS: ICD-10-CM

## 2022-08-04 PROCEDURE — 99213 OFFICE O/P EST LOW 20 MIN: CPT

## 2023-05-01 NOTE — PATIENT PROFILE ADULT - TRANSPORTATION
visiting with his son and a more extensive physical exam  was not performed. IMPRESSION:  A questionable history of dysphagia with drinking apple  cider vinegar as above. His only dysphagia complaint is infrequent  solid food dysphagia with eating chicken. He has no other issues with  eating or drinking. In fact, he ate a sandwich in the emergency room  last night without problems. PLAN:  The speech pathologists will consult. They may want to proceed  with a modified barium swallow, but overall, by the history given by the  patient and his son, there seems to be a minimal dysphagia problem. Pending the findings by the speech pathologists, the patient might be a  candidate for an elective EGD with esophageal dilatation, but he  presently appears to be at significant risk for sedation and anesthesia  given his respiratory symptoms. ANEL Jimenes MD    D: 05/01/2023 15:33:24       T: 05/01/2023 15:37:17     [email or web address]  Job#: [id number]     Doc#: [id number]    CC:  MD Jesus Manuel Thomson MD Linwood Georgis Beatris Pigg, MD Beecher Lawman, Md content. S/p ICD  -No evidence of pacemaker malfunction noted at this time    Complexity of medical decision making-high      Thank you for allowing us to participate in the care of Willy Royergreg.     Tam Tran MD [facility] 5/1/2023 9:03 AM  [address]  5/1/2023 9:03 AM no

## 2023-06-20 ENCOUNTER — NON-APPOINTMENT (OUTPATIENT)
Age: 88
End: 2023-06-20

## 2023-06-21 ENCOUNTER — INPATIENT (INPATIENT)
Facility: HOSPITAL | Age: 88
LOS: 1 days | Discharge: HOME | DRG: 866 | End: 2023-06-23
Attending: HOSPITALIST | Admitting: STUDENT IN AN ORGANIZED HEALTH CARE EDUCATION/TRAINING PROGRAM
Payer: MEDICARE

## 2023-06-21 ENCOUNTER — APPOINTMENT (OUTPATIENT)
Dept: UROLOGY | Facility: CLINIC | Age: 88
End: 2023-06-21
Payer: MEDICARE

## 2023-06-21 VITALS
RESPIRATION RATE: 18 BRPM | SYSTOLIC BLOOD PRESSURE: 141 MMHG | TEMPERATURE: 99 F | DIASTOLIC BLOOD PRESSURE: 64 MMHG | OXYGEN SATURATION: 99 % | WEIGHT: 164.91 LBS | HEART RATE: 101 BPM

## 2023-06-21 VITALS
TEMPERATURE: 97 F | HEIGHT: 60 IN | SYSTOLIC BLOOD PRESSURE: 157 MMHG | WEIGHT: 180 LBS | BODY MASS INDEX: 35.34 KG/M2 | DIASTOLIC BLOOD PRESSURE: 77 MMHG

## 2023-06-21 DIAGNOSIS — N13.8 BENIGN PROSTATIC HYPERPLASIA WITH LOWER URINARY TRACT SYMPMS: ICD-10-CM

## 2023-06-21 DIAGNOSIS — N40.1 BENIGN PROSTATIC HYPERPLASIA WITH LOWER URINARY TRACT SYMPMS: ICD-10-CM

## 2023-06-21 DIAGNOSIS — N30.00 ACUTE CYSTITIS W/OUT HEMATURIA: ICD-10-CM

## 2023-06-21 DIAGNOSIS — Z98.890 OTHER SPECIFIED POSTPROCEDURAL STATES: Chronic | ICD-10-CM

## 2023-06-21 DIAGNOSIS — N28.1 CYST OF KIDNEY, ACQUIRED: ICD-10-CM

## 2023-06-21 DIAGNOSIS — R30.0 DYSURIA: ICD-10-CM

## 2023-06-21 LAB
BILIRUB UR QL STRIP: NORMAL
COLLECTION METHOD: NORMAL
GLUCOSE UR-MCNC: NORMAL
HCG UR QL: 0.2 EU/DL
HGB UR QL STRIP.AUTO: NORMAL
KETONES UR-MCNC: NORMAL
LEUKOCYTE ESTERASE UR QL STRIP: NORMAL
NITRITE UR QL STRIP: NORMAL
PH UR STRIP: 5.5
PROT UR STRIP-MCNC: 30
SP GR UR STRIP: 1.02

## 2023-06-21 PROCEDURE — 99213 OFFICE O/P EST LOW 20 MIN: CPT

## 2023-06-21 PROCEDURE — 51798 US URINE CAPACITY MEASURE: CPT

## 2023-06-21 PROCEDURE — 99285 EMERGENCY DEPT VISIT HI MDM: CPT | Mod: FS

## 2023-06-21 NOTE — ED ADULT TRIAGE NOTE - CHIEF COMPLAINT QUOTE
Patient a+ox3 co SOB today - was seen in urgent care earlier today and was called for elevated D dimer level

## 2023-06-22 DIAGNOSIS — Z95.2 PRESENCE OF PROSTHETIC HEART VALVE: Chronic | ICD-10-CM

## 2023-06-22 DIAGNOSIS — I26.99 OTHER PULMONARY EMBOLISM WITHOUT ACUTE COR PULMONALE: ICD-10-CM

## 2023-06-22 DIAGNOSIS — R09.89 OTHER SPECIFIED SYMPTOMS AND SIGNS INVOLVING THE CIRCULATORY AND RESPIRATORY SYSTEMS: ICD-10-CM

## 2023-06-22 LAB
ALBUMIN SERPL ELPH-MCNC: 3.5 G/DL — SIGNIFICANT CHANGE UP (ref 3.5–5.2)
ALP SERPL-CCNC: 63 U/L — SIGNIFICANT CHANGE UP (ref 30–115)
ALT FLD-CCNC: 16 U/L — SIGNIFICANT CHANGE UP (ref 0–41)
ANION GAP SERPL CALC-SCNC: 10 MMOL/L — SIGNIFICANT CHANGE UP (ref 7–14)
APPEARANCE UR: CLEAR — SIGNIFICANT CHANGE UP
APTT BLD: 30.6 SEC — SIGNIFICANT CHANGE UP (ref 27–39.2)
AST SERPL-CCNC: 21 U/L — SIGNIFICANT CHANGE UP (ref 0–41)
BACTERIA # UR AUTO: NEGATIVE — SIGNIFICANT CHANGE UP
BASE EXCESS BLDV CALC-SCNC: -1.2 MMOL/L — SIGNIFICANT CHANGE UP (ref -2–3)
BASOPHILS # BLD AUTO: 0.05 K/UL — SIGNIFICANT CHANGE UP (ref 0–0.2)
BASOPHILS NFR BLD AUTO: 0.4 % — SIGNIFICANT CHANGE UP (ref 0–1)
BILIRUB SERPL-MCNC: 1.7 MG/DL — HIGH (ref 0.2–1.2)
BILIRUB UR-MCNC: NEGATIVE — SIGNIFICANT CHANGE UP
BUN SERPL-MCNC: 18 MG/DL — SIGNIFICANT CHANGE UP (ref 10–20)
CA-I SERPL-SCNC: 1.16 MMOL/L — SIGNIFICANT CHANGE UP (ref 1.15–1.33)
CALCIUM SERPL-MCNC: 8.4 MG/DL — SIGNIFICANT CHANGE UP (ref 8.4–10.5)
CHLORIDE SERPL-SCNC: 101 MMOL/L — SIGNIFICANT CHANGE UP (ref 98–110)
CO2 SERPL-SCNC: 24 MMOL/L — SIGNIFICANT CHANGE UP (ref 17–32)
COLOR SPEC: YELLOW — SIGNIFICANT CHANGE UP
CREAT SERPL-MCNC: 1.1 MG/DL — SIGNIFICANT CHANGE UP (ref 0.7–1.5)
DIFF PNL FLD: NEGATIVE — SIGNIFICANT CHANGE UP
EGFR: 64 ML/MIN/1.73M2 — SIGNIFICANT CHANGE UP
EOSINOPHIL # BLD AUTO: 0.02 K/UL — SIGNIFICANT CHANGE UP (ref 0–0.7)
EOSINOPHIL NFR BLD AUTO: 0.2 % — SIGNIFICANT CHANGE UP (ref 0–8)
EPI CELLS # UR: 1 /HPF — SIGNIFICANT CHANGE UP (ref 0–5)
GAS PNL BLDV: 132 MMOL/L — LOW (ref 136–145)
GAS PNL BLDV: SIGNIFICANT CHANGE UP
GLUCOSE BLDC GLUCOMTR-MCNC: 104 MG/DL — HIGH (ref 70–99)
GLUCOSE SERPL-MCNC: 126 MG/DL — HIGH (ref 70–99)
GLUCOSE UR QL: NEGATIVE — SIGNIFICANT CHANGE UP
HCO3 BLDV-SCNC: 24 MMOL/L — SIGNIFICANT CHANGE UP (ref 22–29)
HCT VFR BLD CALC: 37.9 % — LOW (ref 42–52)
HCT VFR BLDA CALC: 34 % — LOW (ref 39–51)
HGB BLD CALC-MCNC: 11.3 G/DL — LOW (ref 12.6–17.4)
HGB BLD-MCNC: 12.1 G/DL — LOW (ref 14–18)
HYALINE CASTS # UR AUTO: 1 /LPF — SIGNIFICANT CHANGE UP (ref 0–7)
IMM GRANULOCYTES NFR BLD AUTO: 0.7 % — HIGH (ref 0.1–0.3)
INR BLD: 1.38 RATIO — HIGH (ref 0.65–1.3)
KETONES UR-MCNC: NEGATIVE — SIGNIFICANT CHANGE UP
LACTATE BLDV-MCNC: 2.1 MMOL/L — HIGH (ref 0.5–2)
LACTATE SERPL-SCNC: 1.5 MMOL/L — SIGNIFICANT CHANGE UP (ref 0.7–2)
LACTATE SERPL-SCNC: 2.4 MMOL/L — HIGH (ref 0.7–2)
LEUKOCYTE ESTERASE UR-ACNC: ABNORMAL
LIDOCAIN IGE QN: 77 U/L — HIGH (ref 7–60)
LYMPHOCYTES # BLD AUTO: 0.53 K/UL — LOW (ref 1.2–3.4)
LYMPHOCYTES # BLD AUTO: 4.7 % — LOW (ref 20.5–51.1)
MAGNESIUM SERPL-MCNC: 1.7 MG/DL — LOW (ref 1.8–2.4)
MCHC RBC-ENTMCNC: 19.7 PG — LOW (ref 27–31)
MCHC RBC-ENTMCNC: 31.9 G/DL — LOW (ref 32–37)
MCV RBC AUTO: 61.6 FL — LOW (ref 80–94)
MONOCYTES # BLD AUTO: 0.59 K/UL — SIGNIFICANT CHANGE UP (ref 0.1–0.6)
MONOCYTES NFR BLD AUTO: 5.2 % — SIGNIFICANT CHANGE UP (ref 1.7–9.3)
NEUTROPHILS # BLD AUTO: 9.97 K/UL — HIGH (ref 1.4–6.5)
NEUTROPHILS NFR BLD AUTO: 88.8 % — HIGH (ref 42.2–75.2)
NITRITE UR-MCNC: NEGATIVE — SIGNIFICANT CHANGE UP
NRBC # BLD: 0 /100 WBCS — SIGNIFICANT CHANGE UP (ref 0–0)
NT-PROBNP SERPL-SCNC: 1930 PG/ML — HIGH (ref 0–300)
PCO2 BLDV: 39 MMHG — LOW (ref 42–55)
PH BLDV: 7.39 — SIGNIFICANT CHANGE UP (ref 7.32–7.43)
PH UR: 6 — SIGNIFICANT CHANGE UP (ref 5–8)
PLATELET # BLD AUTO: 162 K/UL — SIGNIFICANT CHANGE UP (ref 130–400)
PMV BLD: SIGNIFICANT CHANGE UP (ref 7.4–10.4)
PO2 BLDV: 29 MMHG — SIGNIFICANT CHANGE UP
POTASSIUM BLDV-SCNC: 3.7 MMOL/L — SIGNIFICANT CHANGE UP (ref 3.5–5.1)
POTASSIUM SERPL-MCNC: 3.8 MMOL/L — SIGNIFICANT CHANGE UP (ref 3.5–5)
POTASSIUM SERPL-SCNC: 3.8 MMOL/L — SIGNIFICANT CHANGE UP (ref 3.5–5)
PROT SERPL-MCNC: 5.3 G/DL — LOW (ref 6–8)
PROT UR-MCNC: ABNORMAL
PROTHROM AB SERPL-ACNC: 15.9 SEC — HIGH (ref 9.95–12.87)
RBC # BLD: 6.15 M/UL — HIGH (ref 4.7–6.1)
RBC # FLD: 17.6 % — HIGH (ref 11.5–14.5)
RBC CASTS # UR COMP ASSIST: 4 /HPF — SIGNIFICANT CHANGE UP (ref 0–4)
SAO2 % BLDV: 46.4 % — SIGNIFICANT CHANGE UP
SODIUM SERPL-SCNC: 135 MMOL/L — SIGNIFICANT CHANGE UP (ref 135–146)
SP GR SPEC: 1.02 — SIGNIFICANT CHANGE UP (ref 1.01–1.03)
TROPONIN T SERPL-MCNC: <0.01 NG/ML — SIGNIFICANT CHANGE UP
UROBILINOGEN FLD QL: SIGNIFICANT CHANGE UP
WBC # BLD: 11.24 K/UL — HIGH (ref 4.8–10.8)
WBC # FLD AUTO: 11.24 K/UL — HIGH (ref 4.8–10.8)
WBC UR QL: 4 /HPF — SIGNIFICANT CHANGE UP (ref 0–5)

## 2023-06-22 PROCEDURE — 83735 ASSAY OF MAGNESIUM: CPT

## 2023-06-22 PROCEDURE — 97162 PT EVAL MOD COMPLEX 30 MIN: CPT | Mod: GP

## 2023-06-22 PROCEDURE — 83605 ASSAY OF LACTIC ACID: CPT | Mod: 59

## 2023-06-22 PROCEDURE — 85025 COMPLETE CBC W/AUTO DIFF WBC: CPT

## 2023-06-22 PROCEDURE — 71275 CT ANGIOGRAPHY CHEST: CPT | Mod: 26,MA

## 2023-06-22 PROCEDURE — 80053 COMPREHEN METABOLIC PANEL: CPT

## 2023-06-22 PROCEDURE — 74177 CT ABD & PELVIS W/CONTRAST: CPT | Mod: 26,ME

## 2023-06-22 PROCEDURE — G1004: CPT

## 2023-06-22 PROCEDURE — 83036 HEMOGLOBIN GLYCOSYLATED A1C: CPT

## 2023-06-22 PROCEDURE — 93306 TTE W/DOPPLER COMPLETE: CPT

## 2023-06-22 PROCEDURE — 82962 GLUCOSE BLOOD TEST: CPT

## 2023-06-22 PROCEDURE — 71045 X-RAY EXAM CHEST 1 VIEW: CPT | Mod: 26

## 2023-06-22 PROCEDURE — 99223 1ST HOSP IP/OBS HIGH 75: CPT

## 2023-06-22 PROCEDURE — 36415 COLL VENOUS BLD VENIPUNCTURE: CPT

## 2023-06-22 RX ORDER — TAMSULOSIN HYDROCHLORIDE 0.4 MG/1
1 CAPSULE ORAL
Qty: 0 | Refills: 0 | DISCHARGE

## 2023-06-22 RX ORDER — SODIUM CHLORIDE 9 MG/ML
1000 INJECTION, SOLUTION INTRAVENOUS ONCE
Refills: 0 | Status: COMPLETED | OUTPATIENT
Start: 2023-06-22 | End: 2023-06-22

## 2023-06-22 RX ORDER — NIFEDIPINE 30 MG
30 TABLET, EXTENDED RELEASE 24 HR ORAL AT BEDTIME
Refills: 0 | Status: DISCONTINUED | OUTPATIENT
Start: 2023-06-22 | End: 2023-06-23

## 2023-06-22 RX ORDER — TAMSULOSIN HYDROCHLORIDE 0.4 MG/1
0.4 CAPSULE ORAL AT BEDTIME
Refills: 0 | Status: DISCONTINUED | OUTPATIENT
Start: 2023-06-22 | End: 2023-06-23

## 2023-06-22 RX ORDER — MAGNESIUM SULFATE 500 MG/ML
2 VIAL (ML) INJECTION ONCE
Refills: 0 | Status: COMPLETED | OUTPATIENT
Start: 2023-06-22 | End: 2023-06-22

## 2023-06-22 RX ORDER — RAMIPRIL 5 MG
1 CAPSULE ORAL
Refills: 0 | DISCHARGE

## 2023-06-22 RX ORDER — CHOLECALCIFEROL (VITAMIN D3) 125 MCG
1000 CAPSULE ORAL DAILY
Refills: 0 | Status: DISCONTINUED | OUTPATIENT
Start: 2023-06-22 | End: 2023-06-23

## 2023-06-22 RX ORDER — ASPIRIN/CALCIUM CARB/MAGNESIUM 324 MG
81 TABLET ORAL DAILY
Refills: 0 | Status: DISCONTINUED | OUTPATIENT
Start: 2023-06-22 | End: 2023-06-22

## 2023-06-22 RX ORDER — FOLIC ACID 0.8 MG
1 TABLET ORAL DAILY
Refills: 0 | Status: DISCONTINUED | OUTPATIENT
Start: 2023-06-22 | End: 2023-06-23

## 2023-06-22 RX ORDER — SILODOSIN 4 MG/1
1 CAPSULE ORAL
Refills: 0 | DISCHARGE

## 2023-06-22 RX ORDER — RAMIPRIL 5 MG
10 CAPSULE ORAL
Qty: 0 | Refills: 0 | DISCHARGE

## 2023-06-22 RX ORDER — ASPIRIN/CALCIUM CARB/MAGNESIUM 324 MG
81 TABLET ORAL AT BEDTIME
Refills: 0 | Status: DISCONTINUED | OUTPATIENT
Start: 2023-06-22 | End: 2023-06-23

## 2023-06-22 RX ORDER — ASPIRIN/CALCIUM CARB/MAGNESIUM 324 MG
1 TABLET ORAL
Qty: 0 | Refills: 0 | DISCHARGE

## 2023-06-22 RX ORDER — SODIUM CHLORIDE 9 MG/ML
1000 INJECTION, SOLUTION INTRAVENOUS
Refills: 0 | Status: DISCONTINUED | OUTPATIENT
Start: 2023-06-22 | End: 2023-06-23

## 2023-06-22 RX ORDER — ENOXAPARIN SODIUM 100 MG/ML
40 INJECTION SUBCUTANEOUS EVERY 24 HOURS
Refills: 0 | Status: DISCONTINUED | OUTPATIENT
Start: 2023-06-22 | End: 2023-06-23

## 2023-06-22 RX ORDER — CHLORHEXIDINE GLUCONATE 213 G/1000ML
1 SOLUTION TOPICAL DAILY
Refills: 0 | Status: DISCONTINUED | OUTPATIENT
Start: 2023-06-22 | End: 2023-06-23

## 2023-06-22 RX ORDER — SERTRALINE 25 MG/1
75 TABLET, FILM COATED ORAL DAILY
Refills: 0 | Status: DISCONTINUED | OUTPATIENT
Start: 2023-06-22 | End: 2023-06-23

## 2023-06-22 RX ORDER — GLUCAGON INJECTION, SOLUTION 0.5 MG/.1ML
1 INJECTION, SOLUTION SUBCUTANEOUS ONCE
Refills: 0 | Status: DISCONTINUED | OUTPATIENT
Start: 2023-06-22 | End: 2023-06-23

## 2023-06-22 RX ORDER — LISINOPRIL 2.5 MG/1
40 TABLET ORAL DAILY
Refills: 0 | Status: DISCONTINUED | OUTPATIENT
Start: 2023-06-22 | End: 2023-06-23

## 2023-06-22 RX ORDER — NIFEDIPINE 30 MG
1 TABLET, EXTENDED RELEASE 24 HR ORAL
Qty: 0 | Refills: 0 | DISCHARGE

## 2023-06-22 RX ORDER — NIFEDIPINE 30 MG
60 TABLET, EXTENDED RELEASE 24 HR ORAL DAILY
Refills: 0 | Status: DISCONTINUED | OUTPATIENT
Start: 2023-06-22 | End: 2023-06-23

## 2023-06-22 RX ORDER — METFORMIN HYDROCHLORIDE 850 MG/1
1 TABLET ORAL
Qty: 0 | Refills: 0 | DISCHARGE

## 2023-06-22 RX ORDER — ENOXAPARIN SODIUM 100 MG/ML
74 INJECTION SUBCUTANEOUS ONCE
Refills: 0 | Status: COMPLETED | OUTPATIENT
Start: 2023-06-22 | End: 2023-06-22

## 2023-06-22 RX ORDER — SERTRALINE 25 MG/1
1 TABLET, FILM COATED ORAL
Refills: 0 | DISCHARGE

## 2023-06-22 RX ORDER — SERTRALINE 25 MG/1
1 TABLET, FILM COATED ORAL
Qty: 0 | Refills: 0 | DISCHARGE

## 2023-06-22 RX ORDER — ATORVASTATIN CALCIUM 80 MG/1
10 TABLET, FILM COATED ORAL AT BEDTIME
Refills: 0 | Status: DISCONTINUED | OUTPATIENT
Start: 2023-06-22 | End: 2023-06-23

## 2023-06-22 RX ORDER — ATORVASTATIN CALCIUM 80 MG/1
1 TABLET, FILM COATED ORAL
Qty: 0 | Refills: 0 | DISCHARGE

## 2023-06-22 RX ORDER — FOLIC ACID 0.8 MG
1 TABLET ORAL
Qty: 0 | Refills: 0 | DISCHARGE

## 2023-06-22 RX ORDER — MULTIVIT-MIN/FERROUS GLUCONATE 9 MG/15 ML
1 LIQUID (ML) ORAL
Qty: 0 | Refills: 0 | DISCHARGE

## 2023-06-22 RX ORDER — INSULIN LISPRO 100/ML
VIAL (ML) SUBCUTANEOUS
Refills: 0 | Status: DISCONTINUED | OUTPATIENT
Start: 2023-06-22 | End: 2023-06-23

## 2023-06-22 RX ORDER — DEXTROSE 50 % IN WATER 50 %
25 SYRINGE (ML) INTRAVENOUS ONCE
Refills: 0 | Status: DISCONTINUED | OUTPATIENT
Start: 2023-06-22 | End: 2023-06-23

## 2023-06-22 RX ORDER — HYDROCHLOROTHIAZIDE 25 MG
1 TABLET ORAL
Qty: 0 | Refills: 0 | DISCHARGE

## 2023-06-22 RX ORDER — DEXTROSE 50 % IN WATER 50 %
15 SYRINGE (ML) INTRAVENOUS ONCE
Refills: 0 | Status: DISCONTINUED | OUTPATIENT
Start: 2023-06-22 | End: 2023-06-23

## 2023-06-22 RX ORDER — ASCORBIC ACID 60 MG
1 TABLET,CHEWABLE ORAL
Qty: 0 | Refills: 0 | DISCHARGE

## 2023-06-22 RX ORDER — ONDANSETRON 8 MG/1
4 TABLET, FILM COATED ORAL ONCE
Refills: 0 | Status: COMPLETED | OUTPATIENT
Start: 2023-06-22 | End: 2023-06-22

## 2023-06-22 RX ORDER — DEXTROSE 50 % IN WATER 50 %
12.5 SYRINGE (ML) INTRAVENOUS ONCE
Refills: 0 | Status: DISCONTINUED | OUTPATIENT
Start: 2023-06-22 | End: 2023-06-23

## 2023-06-22 RX ORDER — ZINC SULFATE TAB 220 MG (50 MG ZINC EQUIVALENT) 220 (50 ZN) MG
1 TAB ORAL
Qty: 0 | Refills: 0 | DISCHARGE

## 2023-06-22 RX ADMIN — ENOXAPARIN SODIUM 40 MILLIGRAM(S): 100 INJECTION SUBCUTANEOUS at 17:19

## 2023-06-22 RX ADMIN — Medication 81 MILLIGRAM(S): at 22:15

## 2023-06-22 RX ADMIN — ATORVASTATIN CALCIUM 10 MILLIGRAM(S): 80 TABLET, FILM COATED ORAL at 22:15

## 2023-06-22 RX ADMIN — Medication 25 GRAM(S): at 05:06

## 2023-06-22 RX ADMIN — Medication 30 MILLIGRAM(S): at 22:15

## 2023-06-22 RX ADMIN — ENOXAPARIN SODIUM 74 MILLIGRAM(S): 100 INJECTION SUBCUTANEOUS at 07:03

## 2023-06-22 RX ADMIN — CHLORHEXIDINE GLUCONATE 1 APPLICATION(S): 213 SOLUTION TOPICAL at 11:32

## 2023-06-22 RX ADMIN — TAMSULOSIN HYDROCHLORIDE 0.4 MILLIGRAM(S): 0.4 CAPSULE ORAL at 22:15

## 2023-06-22 RX ADMIN — Medication 1 TABLET(S): at 17:19

## 2023-06-22 RX ADMIN — SODIUM CHLORIDE 1000 MILLILITER(S): 9 INJECTION, SOLUTION INTRAVENOUS at 02:40

## 2023-06-22 NOTE — ED PROVIDER NOTE - NS ED ATTENDING STATEMENT MOD
oral
This was a shared visit with the NORA. I reviewed and verified the documentation and independently performed the documented:

## 2023-06-22 NOTE — H&P ADULT - ASSESSMENT
Pt is an 89 y/o M with a PMH of CVA, DM, HTN, HLD, depression, melanoma s/p excision, aortic valve replacement and enlarged prostate presenting with complaints of SOB and non-productive cough for the past 2 days, with elevated urgent care d-dimer levels concerning for PE.    #SOB  #Cough  - Urgent care labs showed elevated d-dimer  - No abnormalities seen on CXR  - No central or segmental pulmonary emboli or no acute abdominopelvic pathology seen in CTA chest or CT abd/pelvis.  - Pt received lovenox in ED  - Current O2 saturation is 96% on 2L O2  - Blood and urine cultures pending  - Continue to monitor with daily labs    #UTI  - Pt continues to complain of dysuria  - Pt is on day 2 of Bactrim    #DM  - continue home metformin    #HTN  - /65  - continue home ramipril, nifedipine    #CVA  #HLD  #depression  - continue home aspirin and atorvastatin  - continue home sertraline    #BPH  - continue home silodosin      DVT ppx: Lovenox  GI ppx: Not indicated  Diet: DASH  Activity: As tolerated with PT  Dispo: Floor   Pt is an 89 y/o M with a PMH of CVA, DM, HTN, HLD, depression, melanoma s/p excision, aortic valve replacement and enlarged prostate presenting with complaints of SOB and non-productive cough for the past 2 days, with elevated urgent care d-dimer levels concerning for PE.    #SOB  #Cough  - Urgent care labs showed elevated d-dimer  - No abnormalities seen on CXR  - No central or segmental pulmonary emboli or no acute abdominopelvic pathology seen in CTA chest or CT abd/pelvis.  - Pt received lovenox in ED  - Current O2 saturation is 96% on 2L O2  - Continue to monitor with daily labs    #UTI  - Pt continues to complain of dysuria  - Blood and urine cultures pending  - Pt is on day 2 of Bactrim, continue course    #DM  - continue home metformin    #HTN  - /65  - continue home ramipril, nifedipine    #CVA  #HLD  #depression  - continue home aspirin and atorvastatin  - continue home sertraline    #BPH  - continue home silodosin      DVT ppx: Lovenox  GI ppx: Not indicated  Diet: DASH  Activity: As tolerated with PT  Dispo: Floor   Pt is an 88 y/o M with a PMH of CVA, DM, HTN, HLD, depression, melanoma s/p excision, aortic valve replacement and enlarged prostate presenting with complaints of SOB and non-productive cough for the past 2 days, with elevated urgent care d-dimer levels concerning for PE.    #SOB  #Cough  - Urgent care labs showed elevated d-dimer  - No abnormalities seen on CXR  - No central or segmental pulmonary emboli or no acute abdominopelvic pathology seen in CTA chest or CT abd/pelvis.  - Pt received lovenox in ED  - Current O2 saturation is 96% on 2L O2  - Continue to monitor with daily labs    #UTI  - Pt continues to complain of dysuria  - Blood and urine cultures pending  - Pt is on day 2 of Bactrim, continue course    #DM  - Off metformin for now  - On SS    #HTN  - /65  - continue home ramipril (interchanged with lisinopril), nifedipine    #CVA  #HLD  #depression  - continue home aspirin and atorvastatin  - continue home sertraline    #BPH  - continue home silodosin (needs non- formulary)      DVT ppx: Lovenox  GI ppx: Not indicated  Diet: DASH  Activity: As tolerated with PT  Dispo: Floor

## 2023-06-22 NOTE — H&P ADULT - HISTORY OF PRESENT ILLNESS
Pt is an 88 y/o M with a PMH of CVA, DM, HTN, HLD, depression, melanoma s/p excision, aortic valve replacement and enlarged prostate presenting with complaints of SOB for the past 2 days and a non-productive cough. The pt mentions that he has dysuria a few days ago and was prescribed Bactrim for a UTI upon visiting urgent care. Spoke to pt's son-in-law who mentioned that labs drawn in urgent care showed elevated d-dimer and they brought the pt to the ED. Pt complains of mild SOB and dysuria, but denies chest pain, leg pain or swelling, headache, dizziness, abdominal pain, fever.    ED Course:   Pt is an 89 y/o M with a PMH of CVA, DM, HTN, HLD, depression, melanoma s/p excision, aortic valve replacement and enlarged prostate presenting with complaints of SOB for the past 2 days and a non-productive cough. Pt also reports an episode of non-bloody vomiting once yesterday. He had dysuria a few days ago and was prescribed Bactrim for a UTI upon visiting urgent care. Spoke to pt's son-in-law who mentioned that labs drawn in urgent care showed elevated d-dimer and they brought the pt to the ED. Pt is on day 2 of his Bactrim. Pt complains of mild SOB and dysuria, but denies chest pain, leg pain or swelling, headache, dizziness, abdominal pain, fever, bloody cough.      ED Course:  Pt received Lovenox 74 mg SQ, 1000mL IV bolus of LR, and zofran.  WBC was elevated at 11.24.  Mg was 1.7 in ED, pt received magnesium sulfate, no other metabolic abnormalities.  ECG showed normal sinus rhythm.  CXR showed no abnormalities.  CTA chest showed no central or segmental pulmonary emboli or no acute abdominopelvic pathology.  CT abd/pelvis showed no acute abdominopelvic pathology.

## 2023-06-22 NOTE — ED PROVIDER NOTE - ATTENDING APP SHARED VISIT CONTRIBUTION OF CARE
pt w recent uti, on bactim, family noticed he was having labored breathing, took him to an urgent care, he had an  ekg and cxr and were told they were "fine" but d-dimer was elevated and sent to the ED.  pt denies cp, ab pain, n, v, d. no fever/chills. no back pain.    pt in nad, no resp distress noted, cta, good air movement, rrr, ab soft, nt, no LE edema.

## 2023-06-22 NOTE — ED ADULT NURSE NOTE - NSFALLRISKINTERV_ED_ALL_ED
Assistance OOB with selected safe patient handling equipment if applicable/Assistance with ambulation/Communicate fall risk and risk factors to all staff, patient, and family/Monitor gait and stability/Provide visual cue: yellow wristband, yellow gown, etc/Reinforce activity limits and safety measures with patient and family/Call bell, personal items and telephone in reach/Instruct patient to call for assistance before getting out of bed/chair/stretcher/Non-slip footwear applied when patient is off stretcher/[place] to call system/Physically safe environment - no spills, clutter or unnecessary equipment/Purposeful Proactive Rounding/Room/bathroom lighting operational, light cord in reach

## 2023-06-22 NOTE — H&P ADULT - NSHPLABSRESULTS_GEN_ALL_CORE
12.1   11.24 )-----------( 162      ( 22 Jun 2023 01:49 )             37.9       06-22    135  |  101  |  18  ----------------------------<  126<H>  3.8   |  24  |  1.1    Ca    8.4      22 Jun 2023 03:20  Mg     1.7     06-22    TPro  5.3<L>  /  Alb  3.5  /  TBili  1.7<H>  /  DBili  x   /  AST  21  /  ALT  16  /  AlkPhos  63  06-22              PT/INR - ( 22 Jun 2023 01:49 )   PT: 15.90 sec;   INR: 1.38 ratio         PTT - ( 22 Jun 2023 01:49 )  PTT:30.6 sec      CXR showed no focal consolidation, pneumothorax or pleural effusion. Stable cardiomediastinal silhouette.    CTA chest and CT abdomen and pelvis showed no central or segmental pulmonary emboli, no acute abdominopelvic pathology.

## 2023-06-22 NOTE — ED PROVIDER NOTE - CARE PLAN
Assessment and plan of treatment:	sob  labs, imaging, ekg, supportive care   1 Principal Discharge DX:	Pulmonary embolism  Assessment and plan of treatment:	sob  labs, imaging, ekg, supportive care  Secondary Diagnosis:	Dyspnea  Secondary Diagnosis:	Acute UTI

## 2023-06-22 NOTE — ED ADULT NURSE NOTE - CHIEF COMPLAINT QUOTE
Patient would like to know why he doesn't have automatic refills. Wants to know why he has to call every month.    Patient a+ox3 co SOB today - was seen in urgent care earlier today and was called for elevated D dimer level

## 2023-06-22 NOTE — ED PROVIDER NOTE - OBJECTIVE STATEMENT
89-year-old male with past medical history of HTN, DM, CVA, depression, melanoma s/p excision and HLD presents to the ED with family complaining of shortness of breath and nonproductive cough that started this morning.  Patient was evaluated in urgent care, had labs drawn which revealed elevated D-dimer which prompted ED eval.  Patient also complaining of abdominal pain that started later in the day, nausea and 1 episode of NBNB vomiting that occurred prior to arrival.  Patient is currently on day 2 of Bactrim for UTI.  He denies other complaints. Pt denies fever, chills, diarrhea, headache, dizziness, chest pain, back pain, LOC, trauma, urinary symptoms, calf pain/swelling, recent travel, recent surgery.

## 2023-06-22 NOTE — ED PROVIDER NOTE - PHYSICAL EXAMINATION
VITAL SIGNS: I have reviewed nursing notes and confirm.  CONSTITUTIONAL: 89 yo M laying on stretcher; in no acute distress.  SKIN: Skin exam is warm and dry, no acute rash.  HEAD: Normocephalic; atraumatic.  EYES: PERRL, EOM intact; conjunctiva and sclera clear.  ENT: No nasal discharge; airway clear.   NECK: Supple; non tender.  CARD: S1, S2 normal; no murmurs, gallops, or rubs. Regular rate and rhythm.  RESP: No wheezes, rales or rhonchi. Speaking in full sentences.   ABD: Normal bowel sounds; soft; non-distended; non-tender; No rebound or guarding. No CVA tenderness.  EXT: Normal ROM. No clubbing, cyanosis or edema. No calf TTP or swelling.   NEURO: Alert, oriented. Grossly unremarkable. No focal deficits.

## 2023-06-22 NOTE — H&P ADULT - ATTENDING COMMENTS
89 year old male with PMH of  DM, HTN, HLD, severe aortic stenosis s/p TAVR in 2021 CVA depression, melanoma s/p excision and BPH was brought to ED by his family for SOB for the past 2 days and a non-productive cough. He visited urgent care and had D-dimer level which was elevated, so he was advised to come to the ED. Patient also reports dysuria a few days ago and was prescribed Bactrim for a UTI upon visiting urgent care. He denies chest pain, fever, palpitation or orthopnea. In the ED vital signs were stable, CT chest angio negative for PE or acute pulmonary disease.     PHYSICAL EXAM:  GENERAL: NAD, well-developed.  HEAD:  Atraumatic, Normocephalic.  EYES: EOMI, PERRLA, conjunctiva and sclera clear.  NECK: Supple, No JVD.  CHEST/LUNG: Clear to auscultation bilaterally; No wheeze.  HEART: Regular rate and rhythm; S1 S2. Mild SM on aortic area.   ABDOMEN: Soft, Nontender, Nondistended; Bowel sounds present.  EXTREMITIES:  2+ Peripheral Pulses, No clubbing, cyanosis, or edema.  PSYCH: AAOx3.  NEUROLOGY: non-focal.  SKIN: No rashes or lesions.    A/P:   SOB:   etiology is not clear.   CT chest angio showed no large PE.   ECG showed normal sinus rhythm.  CXR showed no abnormalities.  [id number], mildly elevated, history of TAVR, check echo.     Recent UTI:   UA is negative, continue Bactrim for 7 days.     HTN: continue Nifedipine and ACEi   Mild microcytic anemia: chronic likely from Thalassemia.     Goals of care: discussed with his daughter, patient is DNR/DNI

## 2023-06-22 NOTE — H&P ADULT - NSHPPHYSICALEXAM_GEN_ALL_CORE
Vitals: /65  RR:19  HR 90  Temp 99.9  SpO2 96 on 2L O2  General: NAD, lying in bed comfortably  Head: Atraumatic, Normocephalic  Lungs: CTAB, no wheezing, rales or rhonchi, unlabored respirations  Heart: RRR, nl S1, S2  Extremities: No edema, no cyanosis, no calf tenderness  Neuro: A&Ox3

## 2023-06-23 ENCOUNTER — TRANSCRIPTION ENCOUNTER (OUTPATIENT)
Age: 88
End: 2023-06-23

## 2023-06-23 VITALS
RESPIRATION RATE: 18 BRPM | OXYGEN SATURATION: 98 % | TEMPERATURE: 98 F | DIASTOLIC BLOOD PRESSURE: 67 MMHG | SYSTOLIC BLOOD PRESSURE: 130 MMHG | HEART RATE: 84 BPM

## 2023-06-23 DIAGNOSIS — Z87.438 PERSONAL HISTORY OF OTHER DISEASES OF MALE GENITAL ORGANS: ICD-10-CM

## 2023-06-23 DIAGNOSIS — N39.0 URINARY TRACT INFECTION, SITE NOT SPECIFIED: ICD-10-CM

## 2023-06-23 DIAGNOSIS — F32.9 MAJOR DEPRESSIVE DISORDER, SINGLE EPISODE, UNSPECIFIED: ICD-10-CM

## 2023-06-23 DIAGNOSIS — E11.9 TYPE 2 DIABETES MELLITUS WITHOUT COMPLICATIONS: ICD-10-CM

## 2023-06-23 DIAGNOSIS — R06.02 SHORTNESS OF BREATH: ICD-10-CM

## 2023-06-23 DIAGNOSIS — I10 ESSENTIAL (PRIMARY) HYPERTENSION: ICD-10-CM

## 2023-06-23 DIAGNOSIS — R91.1 SOLITARY PULMONARY NODULE: ICD-10-CM

## 2023-06-23 DIAGNOSIS — Z98.890 OTHER SPECIFIED POSTPROCEDURAL STATES: ICD-10-CM

## 2023-06-23 DIAGNOSIS — Z95.2 PRESENCE OF PROSTHETIC HEART VALVE: ICD-10-CM

## 2023-06-23 DIAGNOSIS — I63.9 CEREBRAL INFARCTION, UNSPECIFIED: ICD-10-CM

## 2023-06-23 DIAGNOSIS — Z79.899 OTHER LONG TERM (CURRENT) DRUG THERAPY: ICD-10-CM

## 2023-06-23 LAB
A1C WITH ESTIMATED AVERAGE GLUCOSE RESULT: 6.3 % — HIGH (ref 4–5.6)
ALBUMIN SERPL ELPH-MCNC: 3.4 G/DL — LOW (ref 3.5–5.2)
ALP SERPL-CCNC: 80 U/L — SIGNIFICANT CHANGE UP (ref 30–115)
ALT FLD-CCNC: 22 U/L — SIGNIFICANT CHANGE UP (ref 0–41)
ANION GAP SERPL CALC-SCNC: 13 MMOL/L — SIGNIFICANT CHANGE UP (ref 7–14)
ANISOCYTOSIS BLD QL: SIGNIFICANT CHANGE UP
AST SERPL-CCNC: 30 U/L — SIGNIFICANT CHANGE UP (ref 0–41)
BASOPHILS # BLD AUTO: 0.07 K/UL — SIGNIFICANT CHANGE UP (ref 0–0.2)
BASOPHILS NFR BLD AUTO: 0.9 % — SIGNIFICANT CHANGE UP (ref 0–1)
BILIRUB SERPL-MCNC: 1.1 MG/DL — SIGNIFICANT CHANGE UP (ref 0.2–1.2)
BUN SERPL-MCNC: 19 MG/DL — SIGNIFICANT CHANGE UP (ref 10–20)
BURR CELLS BLD QL SMEAR: PRESENT — SIGNIFICANT CHANGE UP
CALCIUM SERPL-MCNC: 8.2 MG/DL — LOW (ref 8.4–10.5)
CHLORIDE SERPL-SCNC: 101 MMOL/L — SIGNIFICANT CHANGE UP (ref 98–110)
CO2 SERPL-SCNC: 22 MMOL/L — SIGNIFICANT CHANGE UP (ref 17–32)
CREAT SERPL-MCNC: 0.9 MG/DL — SIGNIFICANT CHANGE UP (ref 0.7–1.5)
CULTURE RESULTS: SIGNIFICANT CHANGE UP
EGFR: 82 ML/MIN/1.73M2 — SIGNIFICANT CHANGE UP
EOSINOPHIL # BLD AUTO: 0.07 K/UL — SIGNIFICANT CHANGE UP (ref 0–0.7)
EOSINOPHIL NFR BLD AUTO: 0.9 % — SIGNIFICANT CHANGE UP (ref 0–8)
ESTIMATED AVERAGE GLUCOSE: 134 MG/DL — HIGH (ref 68–114)
GIANT PLATELETS BLD QL SMEAR: PRESENT — SIGNIFICANT CHANGE UP
GLUCOSE BLDC GLUCOMTR-MCNC: 113 MG/DL — HIGH (ref 70–99)
GLUCOSE BLDC GLUCOMTR-MCNC: 119 MG/DL — HIGH (ref 70–99)
GLUCOSE BLDC GLUCOMTR-MCNC: 94 MG/DL — SIGNIFICANT CHANGE UP (ref 70–99)
GLUCOSE SERPL-MCNC: 103 MG/DL — HIGH (ref 70–99)
HCT VFR BLD CALC: 36.1 % — LOW (ref 42–52)
HGB BLD-MCNC: 11.6 G/DL — LOW (ref 14–18)
HYPOCHROMIA BLD QL: SLIGHT — SIGNIFICANT CHANGE UP
LYMPHOCYTES # BLD AUTO: 0.26 K/UL — LOW (ref 1.2–3.4)
LYMPHOCYTES # BLD AUTO: 3.5 % — LOW (ref 20.5–51.1)
MAGNESIUM SERPL-MCNC: 2.3 MG/DL — SIGNIFICANT CHANGE UP (ref 1.8–2.4)
MANUAL SMEAR VERIFICATION: SIGNIFICANT CHANGE UP
MCHC RBC-ENTMCNC: 19.8 PG — LOW (ref 27–31)
MCHC RBC-ENTMCNC: 32.1 G/DL — SIGNIFICANT CHANGE UP (ref 32–37)
MCV RBC AUTO: 61.7 FL — LOW (ref 80–94)
MICROCYTES BLD QL: SIGNIFICANT CHANGE UP
MONOCYTES # BLD AUTO: 0.2 K/UL — SIGNIFICANT CHANGE UP (ref 0.1–0.6)
MONOCYTES NFR BLD AUTO: 2.6 % — SIGNIFICANT CHANGE UP (ref 1.7–9.3)
NEUTROPHILS # BLD AUTO: 6.81 K/UL — HIGH (ref 1.4–6.5)
NEUTROPHILS NFR BLD AUTO: 87.7 % — HIGH (ref 42.2–75.2)
NEUTS BAND # BLD: 2.6 % — SIGNIFICANT CHANGE UP (ref 0–6)
PLAT MORPH BLD: NORMAL — SIGNIFICANT CHANGE UP
PLATELET # BLD AUTO: 131 K/UL — SIGNIFICANT CHANGE UP (ref 130–400)
PMV BLD: SIGNIFICANT CHANGE UP (ref 7.4–10.4)
POIKILOCYTOSIS BLD QL AUTO: SLIGHT — SIGNIFICANT CHANGE UP
POLYCHROMASIA BLD QL SMEAR: SIGNIFICANT CHANGE UP
POTASSIUM SERPL-MCNC: 3.9 MMOL/L — SIGNIFICANT CHANGE UP (ref 3.5–5)
POTASSIUM SERPL-SCNC: 3.9 MMOL/L — SIGNIFICANT CHANGE UP (ref 3.5–5)
PROT SERPL-MCNC: 5.5 G/DL — LOW (ref 6–8)
RBC # BLD: 5.85 M/UL — SIGNIFICANT CHANGE UP (ref 4.7–6.1)
RBC # FLD: 17.3 % — HIGH (ref 11.5–14.5)
RBC BLD AUTO: ABNORMAL
SODIUM SERPL-SCNC: 136 MMOL/L — SIGNIFICANT CHANGE UP (ref 135–146)
SPECIMEN SOURCE: SIGNIFICANT CHANGE UP
VARIANT LYMPHS # BLD: 1.8 % — SIGNIFICANT CHANGE UP (ref 0–5)
WBC # BLD: 7.54 K/UL — SIGNIFICANT CHANGE UP (ref 4.8–10.8)
WBC # FLD AUTO: 7.54 K/UL — SIGNIFICANT CHANGE UP (ref 4.8–10.8)

## 2023-06-23 PROCEDURE — 93306 TTE W/DOPPLER COMPLETE: CPT | Mod: 26

## 2023-06-23 PROCEDURE — 99239 HOSP IP/OBS DSCHRG MGMT >30: CPT

## 2023-06-23 RX ORDER — CHOLECALCIFEROL (VITAMIN D3) 125 MCG
2000 CAPSULE ORAL
Qty: 0 | Refills: 0 | DISCHARGE
Start: 2023-06-23

## 2023-06-23 RX ORDER — CHOLECALCIFEROL (VITAMIN D3) 125 MCG
0 CAPSULE ORAL
Qty: 0 | Refills: 0 | DISCHARGE

## 2023-06-23 RX ADMIN — CHLORHEXIDINE GLUCONATE 1 APPLICATION(S): 213 SOLUTION TOPICAL at 11:23

## 2023-06-23 RX ADMIN — SERTRALINE 75 MILLIGRAM(S): 25 TABLET, FILM COATED ORAL at 11:23

## 2023-06-23 RX ADMIN — Medication 60 MILLIGRAM(S): at 05:36

## 2023-06-23 RX ADMIN — LISINOPRIL 40 MILLIGRAM(S): 2.5 TABLET ORAL at 05:35

## 2023-06-23 RX ADMIN — Medication 1000 UNIT(S): at 11:22

## 2023-06-23 RX ADMIN — Medication 1 TABLET(S): at 05:35

## 2023-06-23 RX ADMIN — Medication 1 MILLIGRAM(S): at 11:22

## 2023-06-23 NOTE — PROGRESS NOTE ADULT - ASSESSMENT
89M PMHx CVA, DM2, HTN, depression, melanoma s/p resection, s/p AVR, BPH here with shortness of breath, cough.

## 2023-06-23 NOTE — PROGRESS NOTE ADULT - SUBJECTIVE AND OBJECTIVE BOX


MD Easy-to-Read


Additional Instructions: 


Take pain medication as needed.  Follow-up with a primary care provider or a 

pain specialist.


Prescriptions: 


Ketorolac [Toradol] 10 mg PO Q6H PRN #20


 PRN Reason: Pain


Referrals: 


RIGO MORA MD [Staff Physician] - 3-5 Days


[facility] SPECIAL[facility] [Provider Group] - 3-5 Days


Forms:  Work/School Release Form(ED)


Time of Disposition: 14:05 INTERVAL HPI/OVERNIGHT EVENTS:    SUBJECTIVE: Patient seen and examined at bedside.     cc: sob  no cp, sob, abd pain, fever  no sob, orthopnea, pnd, cough    OBJECTIVE:    VITAL SIGNS:  Vital Signs Last 24 Hrs  T(C): 36.8 (23 Jun 2023 07:54), Max: 37.3 (22 Jun 2023 15:52)  T(F): 98.3 (23 Jun 2023 07:54), Max: 99.1 (22 Jun 2023 15:52)  HR: 84 (23 Jun 2023 07:54) (80 - 86)  BP: 130/67 (23 Jun 2023 07:54) (130/67 - 161/78)  BP(mean): 92 (23 Jun 2023 07:54) (92 - 92)  RR: 18 (23 Jun 2023 07:54) (18 - 18)  SpO2: 98% (23 Jun 2023 07:54) (97% - 100%)    Parameters below as of 23 Jun 2023 07:54  Patient On (Oxygen Delivery Method): room air          PHYSICAL EXAM:    General: NAD  HEENT: NC/AT; PERRL, clear conjunctiva  Neck: supple  Respiratory: CTA b/l  Cardiovascular: +S1/S2; RRR  Abdomen: soft, NT/ND; +BS x4  Extremities: WWP, 2+ peripheral pulses b/l; no LE edema  Skin: normal color and turgor; no rash  Neurological:    MEDICATIONS:  MEDICATIONS  (STANDING):  aspirin  chewable 81 milliGRAM(s) Oral at bedtime  atorvastatin 10 milliGRAM(s) Oral at bedtime  chlorhexidine 2% Cloths 1 Application(s) Topical daily  cholecalciferol 1000 Unit(s) Oral daily  dextrose 5%. 1000 milliLiter(s) (50 mL/Hr) IV Continuous <Continuous>  dextrose 5%. 1000 milliLiter(s) (100 mL/Hr) IV Continuous <Continuous>  dextrose 50% Injectable 12.5 Gram(s) IV Push once  dextrose 50% Injectable 25 Gram(s) IV Push once  dextrose 50% Injectable 25 Gram(s) IV Push once  enoxaparin Injectable 40 milliGRAM(s) SubCutaneous every 24 hours  folic acid 1 milliGRAM(s) Oral daily  glucagon  Injectable 1 milliGRAM(s) IntraMuscular once  insulin lispro (ADMELOG) corrective regimen sliding scale   SubCutaneous three times a day before meals  lisinopril 40 milliGRAM(s) Oral daily  NIFEdipine XL 60 milliGRAM(s) Oral daily  NIFEdipine XL 30 milliGRAM(s) Oral at bedtime  sertraline 75 milliGRAM(s) Oral daily  tamsulosin 0.4 milliGRAM(s) Oral at bedtime  trimethoprim  160 mG/sulfamethoxazole 800 mG 1 Tablet(s) Oral two times a day    MEDICATIONS  (PRN):  dextrose Oral Gel 15 Gram(s) Oral once PRN Blood Glucose LESS THAN 70 milliGRAM(s)/deciliter      ALLERGIES:  Allergies    No Known Allergies    Intolerances        LABS:                        11.6   7.54  )-----------( 131      ( 23 Jun 2023 05:48 )             36.1     Hemoglobin: 11.6 g/dL (06-23 @ 05:48)  Hemoglobin: 12.1 g/dL (06-22 @ 01:49)    CBC Full  -  ( 23 Jun 2023 05:48 )  WBC Count : 7.54 K/uL  RBC Count : 5.85 M/uL  Hemoglobin : 11.6 g/dL  Hematocrit : 36.1 %  Platelet Count - Automated : 131 K/uL  Mean Cell Volume : 61.7 fL  Mean Cell Hemoglobin : 19.8 pg  Mean Cell Hemoglobin Concentration : 32.1 g/dL  Auto Neutrophil # : 6.81 K/uL  Auto Lymphocyte # : 0.26 K/uL  Auto Monocyte # : 0.20 K/uL  Auto Eosinophil # : 0.07 K/uL  Auto Basophil # : 0.07 K/uL  Auto Neutrophil % : 87.7 %  Auto Lymphocyte % : 3.5 %  Auto Monocyte % : 2.6 %  Auto Eosinophil % : 0.9 %  Auto Basophil % : 0.9 %    06-23    136  |  101  |  19  ----------------------------<  103<H>  3.9   |  22  |  0.9    Ca    8.2<L>      23 Jun 2023 05:48  Mg     2.3     06-23    TPro  5.5<L>  /  Alb  3.4<L>  /  TBili  1.1  /  DBili  x   /  AST  30  /  ALT  22  /  AlkPhos  80  06-23    Creatinine Trend: 0.9<--, 1.1<--  LIVER FUNCTIONS - ( 23 Jun 2023 05:48 )  Alb: 3.4 g/dL / Pro: 5.5 g/dL / ALK PHOS: 80 U/L / ALT: 22 U/L / AST: 30 U/L / GGT: x           PT/INR - ( 22 Jun 2023 01:49 )   PT: 15.90 sec;   INR: 1.38 ratio         PTT - ( 22 Jun 2023 01:49 )  PTT:30.6 sec    hs Troponin:            Urinalysis Basic - ( 23 Jun 2023 05:48 )    Color: x / Appearance: x / SG: x / pH: x  Gluc: 103 mg/dL / Ketone: x  / Bili: x / Urobili: x   Blood: x / Protein: x / Nitrite: x   Leuk Esterase: x / RBC: x / WBC x   Sq Epi: x / Non Sq Epi: x / Bacteria: x      CSF:                      EKG:   MICROBIOLOGY:    Culture - Urine (collected 22 Jun 2023 04:25)  Source: Clean Catch Clean Catch (Midstream)  Final Report (23 Jun 2023 10:41):    <10,000 CFU/mL Normal Urogenital Trish    Culture - Blood (collected 22 Jun 2023 01:46)  Source: .Blood Blood  Preliminary Report (23 Jun 2023 09:01):    No growth to date.    Culture - Blood (collected 22 Jun 2023 01:44)  Source: .Blood Blood  Preliminary Report (23 Jun 2023 09:01):    No growth to date.      IMAGING:      Labs, imaging, EKG personally reviewed    RADIOLOGY & ADDITIONAL TESTS: Reviewed.

## 2023-06-23 NOTE — PHYSICAL THERAPY INITIAL EVALUATION ADULT - GENERAL OBSERVATIONS, REHAB EVAL
[id number] Pt received and left semifowlers in bed, NAD, pt agreeable to PT session, daughter present throughout

## 2023-06-23 NOTE — DISCHARGE NOTE PROVIDER - DISCHARGE DATE
Pt to er with c/o left ear pain for the last week. Mom denies any fever, chills or body. Per mom pt keeps saying she doesn't feel good and started saying her throat hurt today. Pt has been eating and drinking okay. 23-Jun-2023

## 2023-06-23 NOTE — DISCHARGE NOTE PROVIDER - NSDCMRMEDTOKEN_GEN_ALL_CORE_FT
aspirin 81 mg oral tablet, chewable: 1 tab(s) orally once a day  atorvastatin 10 mg oral tablet: 1 tab(s) orally once a day  Bactrim  mg-160 mg oral tablet: 1 tab(s) orally 2 times a day  Calcium 600+D Plus Minerals oral tablet, chewable: 1 tab(s) orally once a day  folic acid 1 mg oral tablet: 1 tab(s) orally once a day  metFORMIN 500 mg oral tablet, extended release: 1 tab(s) orally once a day  NIFEdipine 60 mg oral tablet, extended release: 1 tab(s) orally once a day  ramipril 10 mg oral capsule: 1 cap(s) orally once a day  sertraline 25 mg oral tablet: 1 orally once a day  sertraline 50 mg oral tablet: 1 tab(s) orally once a day  silodosin 8 mg oral capsule: 1 orally once a day  Vitamin D3 50,000 intl units (1250 mcg) oral capsule:    aspirin 81 mg oral tablet, chewable: 1 tab(s) orally once a day  atorvastatin 10 mg oral tablet: 1 tab(s) orally once a day  Bactrim  mg-160 mg oral tablet: 1 tab(s) orally 2 times a day  Calcium 600+D Plus Minerals oral tablet, chewable: 1 tab(s) orally once a day  cholecalciferol: 2,000 international unit(s) orally once a day  folic acid 1 mg oral tablet: 1 tab(s) orally once a day  metFORMIN 500 mg oral tablet, extended release: 1 tab(s) orally once a day  NIFEdipine 60 mg oral tablet, extended release: 1 tab(s) orally once a day  ramipril 10 mg oral capsule: 1 cap(s) orally once a day  sertraline 25 mg oral tablet: 1 orally once a day  sertraline 50 mg oral tablet: 1 tab(s) orally once a day  silodosin 8 mg oral capsule: 1 orally once a day

## 2023-06-23 NOTE — PHYSICAL THERAPY INITIAL EVALUATION ADULT - GAIT DEVIATIONS NOTED, PT EVAL
decreased elmer/increased time in double stance/decreased step length/decreased weight-shifting ability

## 2023-06-23 NOTE — PHYSICAL THERAPY INITIAL EVALUATION ADULT - PERTINENT HX OF CURRENT PROBLEM, REHAB EVAL
88 y/o M with a PMH of CVA, DM, HTN, HLD, depression, melanoma s/p excision, aortic valve replacement and enlarged prostate presenting with complaints of SOB for the past 2 days and a non-productive cough. Pt also reports an episode of non-bloody vomiting once yesterday. He had dysuria a few days ago and was prescribed Bactrim for a UTI upon visiting urgent care. Spoke to pt's son-in-law who mentioned that labs drawn in urgent care showed elevated d-dimer and they brought the pt to the ED. Pt is on day 2 of his Bactrim. Pt complains of mild SOB and dysuria, but denies chest pain, leg pain or swelling, headache, dizziness, abdominal pain, fever, bloody cough.

## 2023-06-23 NOTE — PROGRESS NOTE ADULT - PROBLEM SELECTOR PLAN 1
with cough, possible resp viral infection  cta chest unrevealing, nodules  no hypoxia, on ra  stable for d/c, needs outpt pmd f/u one week

## 2023-06-23 NOTE — DISCHARGE NOTE PROVIDER - CARE PROVIDER_API CALL
Beth Ross  Internal Medicine  [address] [place] [address]  Phone: [phone]  Fax: [phone]  Established Patient  Follow Up Time: 2 weeks   Reese Melvin  Family Medicine  [address] [place] [address]  Phone: [phone]  Fax: [phone]  Scheduled Appointment: 07/10/2023 03:30 PM    Arjun Banuelos  Pulmonary Disease  [address] [place] [id number]  Phone: [phone]  Fax: [phone]  Follow Up Time: 2 months

## 2023-06-23 NOTE — PHYSICAL THERAPY INITIAL EVALUATION ADULT - ADDITIONAL COMMENTS
Pt lives with wife and children in house on ground floor, no steps to enter. Pt independent in ambulation, occasionally ambulates with SC.

## 2023-06-23 NOTE — DISCHARGE NOTE NURSING/CASE MANAGEMENT/SOCIAL WORK - PATIENT PORTAL LINK FT
You can access the FollowMyHealth Patient Portal offered by [facility] by registering at the following website: [email or web address]. By joining [employer]’s FollowMyHealth portal, you will also be able to view your health information using other applications (apps) compatible with our system.

## 2023-06-23 NOTE — DISCHARGE NOTE PROVIDER - NS AS DC PROVIDER CONTACT Y/N MULTI
For information on Fall & injury Prevention, visit [email or web address]

## 2023-06-23 NOTE — DISCHARGE NOTE PROVIDER - HOSPITAL COURSE
Pt is an 88 y/o M with a PMH of CVA, DM, HTN, HLD, depression, melanoma s/p excision, aortic valve replacement and enlarged prostate presenting with complaints of SOB for the past 2 days and a non-productive cough. Pt also reports an episode of non-bloody vomiting once yesterday. He had dysuria a few days ago and was prescribed Bactrim for a UTI upon visiting urgent care. Spoke to pt's son-in-law who mentioned that labs drawn in urgent care showed elevated d-dimer and they brought the pt to the ED. Pt is on day 2 of his Bactrim. Pt complains of mild SOB and dysuria, but denies chest pain, leg pain or swelling, headache, dizziness, abdominal pain, fever, bloody cough.      ED Course:  Pt received Lovenox 74 mg SQ, 1000mL IV bolus of LR, and zofran.  WBC was elevated at 11.24.  Mg was 1.7 in ED, pt received magnesium sulfate, no other metabolic abnormalities.  ECG showed normal sinus rhythm.  CXR showed no abnormalities.  CTA chest showed no central or segmental pulmonary emboli or no acute abdominopelvic pathology.  CT abd/pelvis showed no acute abdominopelvic pathology.    TTE done showing normal global LV systolic function, mild grade 1 diastolic dysfunction. Blood cultures showed no growth to date. Patient had no hypoxia on room air iand is stable for discharge with outpatient follow up with his PCP. Outpatient UCx with e.coli, pan sensitive, on day 3/7 course.       Pt is an 89 y/o M with a PMH of CVA, DM, HTN, HLD, depression, melanoma s/p excision, aortic valve replacement and enlarged prostate presenting with complaints of SOB for the past 2 days and a non-productive cough. Pt also reports an episode of non-bloody vomiting once yesterday. He had dysuria a few days ago and was prescribed Bactrim for a UTI upon visiting urgent care. Spoke to pt's son-in-law who mentioned that labs drawn in urgent care showed elevated d-dimer and they brought the pt to the ED. Pt is on day 2 of his Bactrim. Pt complains of mild SOB and dysuria, but denies chest pain, leg pain or swelling, headache, dizziness, abdominal pain, fever, bloody cough.      ED Course:  Pt received Lovenox 74 mg SQ, 1000mL IV bolus of LR, and zofran.  WBC was elevated at 11.24.  Mg was 1.7 in ED, pt received magnesium sulfate, no other metabolic abnormalities.  ECG showed normal sinus rhythm.  CXR showed no abnormalities.  CTA chest showed no central or segmental pulmonary emboli or no acute abdominopelvic pathology.  CT abd/pelvis showed no acute abdominopelvic pathology.    TTE done showing normal global LV systolic function, mild grade 1 diastolic dysfunction. Blood cultures showed no growth to date. Patient had no hypoxia on room air and is stable for discharge with outpatient follow up with his PCP. Outpatient UCx with e.coli, pan sensitive, on day 3/7 course. Patient will continue complete course and follow up with PCP.

## 2023-06-23 NOTE — DISCHARGE NOTE PROVIDER - PROVIDER TOKENS
PROVIDER:[TOKEN:[[id number]],FOLLOWUP:[2 weeks],ESTABLISHEDPATIENT:[T]] PROVIDER:[TOKEN:[[id number]],SCHEDULEDAPPT:[07/10/2023],SCHEDULEDAPPTTIME:[03:30 PM]],PROVIDER:[TOKEN:[[id number]],FOLLOWUP:[2 months]]

## 2023-06-23 NOTE — DISCHARGE NOTE PROVIDER - NSDCCPCAREPLAN_GEN_ALL_CORE_FT
PRINCIPAL DISCHARGE DIAGNOSIS  Diagnosis: Dyspnea  Assessment and Plan of Treatment: Shortness of breath  Shortness of breath (dyspnea) means you have trouble breathing and could indicate a medical problem. Causes include lung disease, heart disease, low amount of red blood cells (anemia), poor physical fitness, being overweight, smoking, etc. Your health care provider today may not be able to find a cause for your shortness of breath after your exam. In this case, it is important to have a follow-up exam with your primary care physician as instructed. If medicines were prescribed, take them as directed for the full length of time directed. Refrain from tobacco products.  SEEK IMMEDIATE MEDICAL CARE IF YOU HAVE ANY OF THE FOLLOWING SYMPTOMS: worsening shortness of breath, chest pain, back pain, abdominal pain, fever, coughing up blood, lightheadedness/dizziness. Please follow up with your primary care doctor within 2 weeks of discharge.        SECONDARY DISCHARGE DIAGNOSES  Diagnosis: Dyspnea  Assessment and Plan of Treatment:     Diagnosis: Acute UTI  Assessment and Plan of Treatment: WHAT YOU NEED TO KNOW:  A urinary tract infection (UTI) is caused by bacteria that get inside your urinary tract. Most bacteria that enter your urinary tract come out when you urinate. If the bacteria stay in your urinary tract, you may get an infection. Your urinary tract includes your kidneys, ureters, bladder, and urethra. Urine is made in your kidneys, and it flows from the ureters to the bladder. Urine leaves the bladder through the urethra. A UTI is more common in your lower urinary tract, which includes your bladder and urethra.  Seek care immediately if:  •You are urinating very little or not at all.  •You have a high fever with shaking chills.  •You have side or back pain that gets worse.  Contact your healthcare provider if:  •You have a fever.  •You do not feel better after 2 days of taking antibiotics.  •You are vomiting.  •You have questions or concerns about your condition or care.  Prevent another UTI:  •Empty your bladder often. Urinate and empty your bladder as soon as you feel the need. Do not hold your urine for long periods of time.  •Wipe from front to back after you urinate or have a bowel movement. This will help prevent germs from getting into your urinary tract through your urethra.  •Drink liquids as directed. Ask how much liquid to drink each day and which liquids are best for you. You may need to drink more liquids than usual to help flush out the bacteria. Do not drink alcohol, caffeine, or citrus juices. These can irritate your bladder and increase your symptoms. Your healthcare provider may recommend cranberry juice to help prevent a UTI. Please continue taking the Bactrim as instructed.       PRINCIPAL DISCHARGE DIAGNOSIS  Diagnosis: Dyspnea  Assessment and Plan of Treatment: Shortness of breath  Shortness of breath (dyspnea) means you have trouble breathing and could indicate a medical problem. Causes include lung disease, heart disease, low amount of red blood cells (anemia), poor physical fitness, being overweight, smoking, etc. Your health care provider today may not be able to find a cause for your shortness of breath after your exam. In this case, it is important to have a follow-up exam with your primary care physician as instructed. If medicines were prescribed, take them as directed for the full length of time directed. Refrain from tobacco products.  SEEK IMMEDIATE MEDICAL CARE IF YOU HAVE ANY OF THE FOLLOWING SYMPTOMS: worsening shortness of breath, chest pain, back pain, abdominal pain, fever, coughing up blood, lightheadedness/dizziness. Please follow up with your primary care doctor within 2 weeks of discharge.        SECONDARY DISCHARGE DIAGNOSES  Diagnosis: Dyspnea  Assessment and Plan of Treatment:     Diagnosis: Acute UTI  Assessment and Plan of Treatment: WHAT YOU NEED TO KNOW:  A urinary tract infection (UTI) is caused by bacteria that get inside your urinary tract. Most bacteria that enter your urinary tract come out when you urinate. If the bacteria stay in your urinary tract, you may get an infection. Your urinary tract includes your kidneys, ureters, bladder, and urethra. Urine is made in your kidneys, and it flows from the ureters to the bladder. Urine leaves the bladder through the urethra. A UTI is more common in your lower urinary tract, which includes your bladder and urethra.  Seek care immediately if:  •You are urinating very little or not at all.  •You have a high fever with shaking chills.  •You have side or back pain that gets worse.  Contact your healthcare provider if:  •You have a fever.  •You do not feel better after 2 days of taking antibiotics.  •You are vomiting.  •You have questions or concerns about your condition or care.  Prevent another UTI:  •Empty your bladder often. Urinate and empty your bladder as soon as you feel the need. Do not hold your urine for long periods of time.  •Wipe from front to back after you urinate or have a bowel movement. This will help prevent germs from getting into your urinary tract through your urethra.  •Drink liquids as directed. Ask how much liquid to drink each day and which liquids are best for you. You may need to drink more liquids than usual to help flush out the bacteria. Do not drink alcohol, caffeine, or citrus juices. These can irritate your bladder and increase your symptoms. Your healthcare provider may recommend cranberry juice to help prevent a UTI. Please continue taking the Bactrim as instructed.

## 2023-07-03 DIAGNOSIS — B34.9 VIRAL INFECTION, UNSPECIFIED: ICD-10-CM

## 2023-07-03 DIAGNOSIS — Z95.2 PRESENCE OF PROSTHETIC HEART VALVE: ICD-10-CM

## 2023-07-03 DIAGNOSIS — F32.9 MAJOR DEPRESSIVE DISORDER, SINGLE EPISODE, UNSPECIFIED: ICD-10-CM

## 2023-07-03 DIAGNOSIS — D50.9 IRON DEFICIENCY ANEMIA, UNSPECIFIED: ICD-10-CM

## 2023-07-03 DIAGNOSIS — B96.20 UNSPECIFIED ESCHERICHIA COLI [E. COLI] AS THE CAUSE OF DISEASES CLASSIFIED ELSEWHERE: ICD-10-CM

## 2023-07-03 DIAGNOSIS — N39.0 URINARY TRACT INFECTION, SITE NOT SPECIFIED: ICD-10-CM

## 2023-07-03 DIAGNOSIS — E11.9 TYPE 2 DIABETES MELLITUS WITHOUT COMPLICATIONS: ICD-10-CM

## 2023-07-03 DIAGNOSIS — Z85.820 PERSONAL HISTORY OF MALIGNANT MELANOMA OF SKIN: ICD-10-CM

## 2023-07-03 DIAGNOSIS — Z79.82 LONG TERM (CURRENT) USE OF ASPIRIN: ICD-10-CM

## 2023-07-03 DIAGNOSIS — R91.1 SOLITARY PULMONARY NODULE: ICD-10-CM

## 2023-07-03 DIAGNOSIS — Z86.73 PERSONAL HISTORY OF TRANSIENT ISCHEMIC ATTACK (TIA), AND CEREBRAL INFARCTION WITHOUT RESIDUAL DEFICITS: ICD-10-CM

## 2023-07-03 DIAGNOSIS — E78.5 HYPERLIPIDEMIA, UNSPECIFIED: ICD-10-CM

## 2023-07-03 DIAGNOSIS — N40.0 BENIGN PROSTATIC HYPERPLASIA WITHOUT LOWER URINARY TRACT SYMPTOMS: ICD-10-CM

## 2023-07-03 DIAGNOSIS — Z66 DO NOT RESUSCITATE: ICD-10-CM

## 2023-07-03 DIAGNOSIS — I10 ESSENTIAL (PRIMARY) HYPERTENSION: ICD-10-CM

## 2023-07-03 DIAGNOSIS — Z79.84 LONG TERM (CURRENT) USE OF ORAL HYPOGLYCEMIC DRUGS: ICD-10-CM

## 2024-03-21 NOTE — ED ADULT TRIAGE NOTE - WEIGHT IN LBS
[facility] contacted [place] for information on walk in hours and medication management to assist pt with linkage to services    [facility] then contacted pt and provided walk in hours Mon-Friday 8-1pm in order for pt to have assessment, be followed for past substance use, and medication management.     Pt was agreeable to this information direct contact information was provided for further questions  
[phone]

## 2024-04-19 LAB
APPEARANCE: CLEAR
BILIRUBIN URINE: NEGATIVE
BLOOD URINE: NEGATIVE
COLOR: NORMAL
GLUCOSE QUALITATIVE U: NEGATIVE MG/DL
KETONES URINE: NEGATIVE MG/DL
LEUKOCYTE ESTERASE URINE: NEGATIVE
NITRITE URINE: NEGATIVE
PH URINE: 5.5
PROTEIN URINE: NORMAL MG/DL
SPECIFIC GRAVITY URINE: 1.02
UROBILINOGEN URINE: 0.2 MG/DL

## 2024-04-23 LAB — BACTERIA UR CULT: NORMAL

## 2024-08-26 NOTE — PROGRESS NOTE ADULT - ASSESSMENT
----- Message from Geovanna Thompson [id number] - [facility] [address] AT [address] & [facility] [phone]   86 yo M pt w/ multiple medical ailments being treated on an out-pt basis for a UTI now coming with a failure to thrive picture (poor PO intake, mild neuro-cognitive impairment, functional decline w/ an inability to ambulate due to generalized weakness). Hemodynamically stable and afebrile. Needs PT.    (1) Failure to thrive (poor PO intake, mild neuro-cognitive impairment, functional decline):  --- Supportive care: optimize nutritional status - encourage PO intake  --- PT eval    (2) Stage 2 acute renal failure (serum Cr 1 in 2018) - suspect pre-renal etiology 2/2 poor PO:  --- S/p fluid bolus in the ED  --- S/p downey placement in the ED  --- Monitor urine output (strict I&O)  --- Monitor BUN/Cr  --- Avoid nephrotoxins  --- Dose medications renally    (3) Urinary retention 2/2 BPH (s/p downye catheterization in the ED w/ 400 mL subsequent output):  --- C/w downey catheter for now  --- C/w tamsulosin  --- Can consider voiding trial prior to d/c vs. out-pt Urology f/u for TOV    (4) S/p out-pt treatment for UTI w/ 7 day course of cipro & 11 days of TMP-SMX:  --- Monitor off abx (pt has been adequately treated for a UTI)    (5) DM w/ hyperglycemia:  --- POC BG qAC & qHS  --- Use gentle correction scale for now (poor PO intake - risk for hypoglycemia)  --- Hypoglycemia protocol PRN  --- If BG levels persistently elevated, start basal bolus insulin and titrate up as necessary  --- F/u A1C    (6) HTN - chronic (now relatively hypotensive) / dyslipidemia - chronic:  --- C/w statin as dosed  --- Holding anti-hypertensives    (7) Microcytic anemia possible thalassemia trait (Mentzer index < 13):  --- Stable H&H (monitor periodically - no c/o bleeding)    (8) Hyponatremia w/ dehydration:  --- S/p fluid boluses in the ED  --- F/u repeat BMP    (9) Elevated anion gap acidosis w/ concurrent met alkalosis 2/2 renal insufficiency:  --- Clinically stable  --- Monitor renal function as above    (10) Mildly elevated lactic acid level likely 2/2 dehydration and poor PO intake:  --- S/p fluid resuscitation; encourage PO intake    (11) Hx of CVA in 2015 w/ residual deficits - chronic:  --- ASA, statin  --- Supportive care  --- PT    (12) Depression - stable mood:  --- C/w sertraline    (13) Miscellaneous:  --- Monitor and replete lytes PRN  --- Carb consistent / DASH-TLC diet  --- DVT ppx w/ heparin  --- GI ppx is not indicated    (14) Disposition:  --- Was placed on Telemetry as there was concern for ?A-fib - EKG unrevealing (can repeat EKG)  --- If remains stable, clinically, can downgrade from Telemetry  --- Pending PT evaluation and treatment  --- Pending clinical improvement  --- Pending correction of hyponatremia  --- Anticipate need for 48 hrs of in-pt care    Full code   86 yo M pt w/ multiple medical ailments being treated on an out-pt basis for a UTI now coming with a failure to thrive picture (poor PO intake, mild neuro-cognitive impairment, functional decline w/ an inability to ambulate due to generalized weakness). Hemodynamically stable and afebrile. Needs PT.    (1) Failure to thrive (poor PO intake, mild neuro-cognitive impairment, functional decline):  --- Supportive care: optimize nutritional status - encourage PO intake  --- PT eval    (2) Stage 2 acute renal failure (serum Cr 1 in 2018) - suspect pre-renal etiology 2/2 poor PO:  --- S/p fluid bolus in the ED  --- S/p downey placement in the ED  --- Monitor urine output (strict I&O)  --- Monitor BUN/Cr  --- Avoid nephrotoxins  --- Dose medications renally    (3) Urinary retention 2/2 BPH (s/p downey catheterization in the ED w/ 400 mL subsequent output):  --- C/w downey catheter for now  --- C/w tamsulosin  --- Can consider voiding trial prior to d/c vs. out-pt Urology f/u for TOV    (4) S/p out-pt treatment for UTI w/ 7 day course of cipro & 11 days of TMP-SMX:  --- Monitor off abx (pt has been adequately treated for a UTI)    (5) DM w/ hyperglycemia:  --- POC BG qAC & qHS  --- Use gentle correction scale for now (poor PO intake - risk for hypoglycemia)  --- Hypoglycemia protocol PRN  --- If BG levels persistently elevated, start basal bolus insulin and titrate up as necessary  --- F/u A1C    (6) HTN - chronic (now relatively hypotensive) / dyslipidemia - chronic:  --- C/w statin as dosed  --- Holding anti-hypertensives    (7) Microcytic anemia possible thalassemia trait (Mentzer index < 13):  --- Stable H&H (monitor periodically - no c/o bleeding)    (8) Hyponatremia w/ dehydration:  --- S/p fluid boluses in the ED  --- F/u repeat BMP    (9) Elevated anion gap acidosis w/ concurrent met alkalosis 2/2 renal insufficiency:  --- Clinically stable  --- Monitor renal function as above    (10) Mildly elevated lactic acid level likely 2/2 dehydration and poor PO intake:  --- S/p fluid resuscitation; encourage PO intake    (11) Hx of CVA in 2015 w/ residual deficits - chronic:  --- ASA, statin  --- Supportive care  --- PT    (12) Depression - stable mood:  --- C/w sertraline    13 Thiamine deficiency  --- c/w thiamine    (14) Miscellaneous:  --- Monitor and replete lytes PRN  --- Carb consistent / DASH-TLC diet  --- DVT ppx w/ heparin  --- GI ppx is not indicated    (15) Disposition:  --- Was placed on Telemetry as there was concern for ?A-fib - EKG unrevealing (can repeat EKG)  --- If remains stable, clinically, can downgrade from Telemetry  --- Pending PT evaluation and treatment  --- Pending clinical improvement  --- Pending correction of hyponatremia  --- Anticipate need for 48 hrs of in-pt care    Full code

## 2024-09-30 NOTE — PATIENT PROFILE ADULT - NSPROPTRIGHTBILLOFRIGHTS_GEN_A_NUR
Per pt report gabapentin/lidocaine cream needs to go to  compounding pharmacy, retail pharmacy rx was originally sent to cannot fill.     RN called pt, she reports that rx was transferred to compounding pharmacy and she has already received rx. Nothing further needed.       Troy Norris RN, BSN  [facility]     patient

## 2025-04-29 NOTE — ED ADULT NURSE NOTE - NS ED NOTE ABUSE SUSPICION NEGLECT YN
Per chart review, appears chronic  No evidence of active bleeding  Holding DVT prophylaxis  Daily CBC   No